# Patient Record
Sex: MALE | Race: OTHER | HISPANIC OR LATINO | Employment: OTHER | URBAN - METROPOLITAN AREA
[De-identification: names, ages, dates, MRNs, and addresses within clinical notes are randomized per-mention and may not be internally consistent; named-entity substitution may affect disease eponyms.]

---

## 2023-10-30 ENCOUNTER — HOSPITAL ENCOUNTER (EMERGENCY)
Facility: HOSPITAL | Age: 38
Discharge: PSYCHIATRIC OR SUBSTANCE USE FACILITY | End: 2023-10-31
Attending: EMERGENCY MEDICINE

## 2023-10-30 DIAGNOSIS — F14.90 COCAINE USE: ICD-10-CM

## 2023-10-30 DIAGNOSIS — R44.0 AUDITORY HALLUCINATIONS: Primary | ICD-10-CM

## 2023-10-30 LAB
ALBUMIN SERPL BCP-MCNC: 3.9 G/DL (ref 3.5–5.2)
ALP SERPL-CCNC: 93 U/L (ref 55–135)
ALT SERPL W/O P-5'-P-CCNC: 16 U/L (ref 10–44)
AMPHET+METHAMPHET UR QL: NEGATIVE
ANION GAP SERPL CALC-SCNC: 11 MMOL/L (ref 8–16)
APAP SERPL-MCNC: <3 UG/ML (ref 10–20)
AST SERPL-CCNC: 12 U/L (ref 10–40)
BARBITURATES UR QL SCN>200 NG/ML: NEGATIVE
BASOPHILS # BLD AUTO: 0.04 K/UL (ref 0–0.2)
BASOPHILS NFR BLD: 0.7 % (ref 0–1.9)
BENZODIAZ UR QL SCN>200 NG/ML: NEGATIVE
BILIRUB SERPL-MCNC: 0.2 MG/DL (ref 0.1–1)
BILIRUB UR QL STRIP: NEGATIVE
BUN SERPL-MCNC: 9 MG/DL (ref 6–20)
BZE UR QL SCN: ABNORMAL
CALCIUM SERPL-MCNC: 9.1 MG/DL (ref 8.7–10.5)
CANNABINOIDS UR QL SCN: NEGATIVE
CHLORIDE SERPL-SCNC: 108 MMOL/L (ref 95–110)
CLARITY UR: CLEAR
CO2 SERPL-SCNC: 26 MMOL/L (ref 23–29)
COLOR UR: YELLOW
CREAT SERPL-MCNC: 0.8 MG/DL (ref 0.5–1.4)
CREAT UR-MCNC: 65.1 MG/DL (ref 23–375)
CTP QC/QA: YES
DIFFERENTIAL METHOD: ABNORMAL
EOSINOPHIL # BLD AUTO: 0.4 K/UL (ref 0–0.5)
EOSINOPHIL NFR BLD: 6.4 % (ref 0–8)
ERYTHROCYTE [DISTWIDTH] IN BLOOD BY AUTOMATED COUNT: 12.7 % (ref 11.5–14.5)
EST. GFR  (NO RACE VARIABLE): >60 ML/MIN/1.73 M^2
ETHANOL SERPL-MCNC: <10 MG/DL
GLUCOSE SERPL-MCNC: 103 MG/DL (ref 70–110)
GLUCOSE UR QL STRIP: NEGATIVE
HCT VFR BLD AUTO: 40.6 % (ref 40–54)
HGB BLD-MCNC: 13.2 G/DL (ref 14–18)
HGB UR QL STRIP: NEGATIVE
IMM GRANULOCYTES # BLD AUTO: 0.01 K/UL (ref 0–0.04)
IMM GRANULOCYTES NFR BLD AUTO: 0.2 % (ref 0–0.5)
KETONES UR QL STRIP: NEGATIVE
LEUKOCYTE ESTERASE UR QL STRIP: NEGATIVE
LYMPHOCYTES # BLD AUTO: 1.9 K/UL (ref 1–4.8)
LYMPHOCYTES NFR BLD: 33.4 % (ref 18–48)
MCH RBC QN AUTO: 29.4 PG (ref 27–31)
MCHC RBC AUTO-ENTMCNC: 32.5 G/DL (ref 32–36)
MCV RBC AUTO: 90 FL (ref 82–98)
METHADONE UR QL SCN>300 NG/ML: NEGATIVE
MONOCYTES # BLD AUTO: 0.6 K/UL (ref 0.3–1)
MONOCYTES NFR BLD: 10.2 % (ref 4–15)
NEUTROPHILS # BLD AUTO: 2.8 K/UL (ref 1.8–7.7)
NEUTROPHILS NFR BLD: 49.1 % (ref 38–73)
NITRITE UR QL STRIP: NEGATIVE
NRBC BLD-RTO: 0 /100 WBC
OPIATES UR QL SCN: NEGATIVE
PCP UR QL SCN>25 NG/ML: NEGATIVE
PH UR STRIP: 6 [PH] (ref 5–8)
PLATELET # BLD AUTO: 271 K/UL (ref 150–450)
PMV BLD AUTO: 11.2 FL (ref 9.2–12.9)
POTASSIUM SERPL-SCNC: 4 MMOL/L (ref 3.5–5.1)
PROT SERPL-MCNC: 6.9 G/DL (ref 6–8.4)
PROT UR QL STRIP: NEGATIVE
RBC # BLD AUTO: 4.49 M/UL (ref 4.6–6.2)
SARS-COV-2 RDRP RESP QL NAA+PROBE: NEGATIVE
SODIUM SERPL-SCNC: 145 MMOL/L (ref 136–145)
SP GR UR STRIP: 1.01 (ref 1–1.03)
TOXICOLOGY INFORMATION: ABNORMAL
TSH SERPL DL<=0.005 MIU/L-ACNC: 2.17 UIU/ML (ref 0.4–4)
URN SPEC COLLECT METH UR: NORMAL
UROBILINOGEN UR STRIP-ACNC: NEGATIVE EU/DL
WBC # BLD AUTO: 5.6 K/UL (ref 3.9–12.7)

## 2023-10-30 PROCEDURE — 80053 COMPREHEN METABOLIC PANEL: CPT

## 2023-10-30 PROCEDURE — 87635 SARS-COV-2 COVID-19 AMP PRB: CPT | Performed by: EMERGENCY MEDICINE

## 2023-10-30 PROCEDURE — 80307 DRUG TEST PRSMV CHEM ANLYZR: CPT

## 2023-10-30 PROCEDURE — 85025 COMPLETE CBC W/AUTO DIFF WBC: CPT

## 2023-10-30 PROCEDURE — 80143 DRUG ASSAY ACETAMINOPHEN: CPT

## 2023-10-30 PROCEDURE — 82077 ASSAY SPEC XCP UR&BREATH IA: CPT

## 2023-10-30 PROCEDURE — 81003 URINALYSIS AUTO W/O SCOPE: CPT | Mod: 59

## 2023-10-30 PROCEDURE — 84443 ASSAY THYROID STIM HORMONE: CPT

## 2023-10-30 PROCEDURE — 99285 EMERGENCY DEPT VISIT HI MDM: CPT

## 2023-10-30 NOTE — FIRST PROVIDER EVALUATION
"Medical screening examination initiated.  I have conducted a focused provider triage encounter, findings are as follows:    Brief history of present illness:  Male presenting to the emergency department with a chief complaint of auditory hallucinations.  He has a history of problem and states that hospitalized in the past.  He has been compliant with his antipsychotic medications until 5-6 days ago.  Symptoms returned 3-4 days ago.  He states that he feels that everyone is looking at him.  Voices telling him to turn himself in before things get bad".  Currently denies any suicidal or homicidal ideation or plan.    Vitals:    10/30/23 1838   BP: 115/70   BP Location: Right arm   Patient Position: Sitting   Pulse: 98   Resp: 17   Temp: 98.2 °F (36.8 °C)   TempSrc: Oral   SpO2: 100%   Weight: 61.2 kg (135 lb)   Height: 5' 5" (1.651 m)       Pertinent physical exam:  Clinically well-appearing.  No acute distress.  Thought appears linear.  Denies SI, HI.    Brief workup plan:  Basic labs, psych evaluation.    Preliminary workup initiated; this workup will be continued and followed by the physician or advanced practice provider that is assigned to the patient when roomed.  "

## 2023-10-31 ENCOUNTER — HOSPITAL ENCOUNTER (INPATIENT)
Facility: HOSPITAL | Age: 38
LOS: 8 days | Discharge: HOME | DRG: 885 | End: 2023-11-08
Attending: PSYCHIATRY & NEUROLOGY | Admitting: PSYCHIATRY & NEUROLOGY

## 2023-10-31 VITALS
WEIGHT: 135 LBS | RESPIRATION RATE: 18 BRPM | HEIGHT: 65 IN | OXYGEN SATURATION: 98 % | SYSTOLIC BLOOD PRESSURE: 114 MMHG | TEMPERATURE: 97 F | BODY MASS INDEX: 22.49 KG/M2 | DIASTOLIC BLOOD PRESSURE: 55 MMHG | HEART RATE: 78 BPM

## 2023-10-31 DIAGNOSIS — F29 PSYCHOSIS: Primary | ICD-10-CM

## 2023-10-31 PROCEDURE — 90833 PR PSYCHOTHERAPY W/PATIENT W/E&M, 30 MIN (ADD ON): ICD-10-PCS | Mod: ,,, | Performed by: PSYCHIATRY & NEUROLOGY

## 2023-10-31 PROCEDURE — 25000003 PHARM REV CODE 250: Performed by: PSYCHIATRY & NEUROLOGY

## 2023-10-31 PROCEDURE — S4991 NICOTINE PATCH NONLEGEND: HCPCS | Performed by: PSYCHIATRY & NEUROLOGY

## 2023-10-31 PROCEDURE — 99223 PR INITIAL HOSPITAL CARE,LEVL III: ICD-10-PCS | Mod: ,,, | Performed by: PSYCHIATRY & NEUROLOGY

## 2023-10-31 PROCEDURE — 90833 PSYTX W PT W E/M 30 MIN: CPT | Mod: ,,, | Performed by: PSYCHIATRY & NEUROLOGY

## 2023-10-31 PROCEDURE — 99223 1ST HOSP IP/OBS HIGH 75: CPT | Mod: ,,, | Performed by: PSYCHIATRY & NEUROLOGY

## 2023-10-31 PROCEDURE — 11400000 HC PSYCH PRIVATE ROOM

## 2023-10-31 RX ORDER — BENZTROPINE MESYLATE 1 MG/ML
2 INJECTION, SOLUTION INTRAMUSCULAR; INTRAVENOUS EVERY 8 HOURS PRN
Status: DISCONTINUED | OUTPATIENT
Start: 2023-10-31 | End: 2023-11-08 | Stop reason: HOSPADM

## 2023-10-31 RX ORDER — ONDANSETRON 4 MG/1
4 TABLET, ORALLY DISINTEGRATING ORAL EVERY 8 HOURS PRN
Status: DISCONTINUED | OUTPATIENT
Start: 2023-10-31 | End: 2023-11-08 | Stop reason: HOSPADM

## 2023-10-31 RX ORDER — BENZONATATE 100 MG/1
100 CAPSULE ORAL 3 TIMES DAILY PRN
Status: DISCONTINUED | OUTPATIENT
Start: 2023-10-31 | End: 2023-11-08 | Stop reason: HOSPADM

## 2023-10-31 RX ORDER — ACETAMINOPHEN 325 MG/1
650 TABLET ORAL EVERY 6 HOURS PRN
Status: DISCONTINUED | OUTPATIENT
Start: 2023-10-31 | End: 2023-11-08 | Stop reason: HOSPADM

## 2023-10-31 RX ORDER — OLANZAPINE 10 MG/1
10 TABLET ORAL DAILY
Status: DISCONTINUED | OUTPATIENT
Start: 2023-10-31 | End: 2023-11-02

## 2023-10-31 RX ORDER — LOPERAMIDE HYDROCHLORIDE 2 MG/1
2 CAPSULE ORAL
Status: DISCONTINUED | OUTPATIENT
Start: 2023-10-31 | End: 2023-11-08 | Stop reason: HOSPADM

## 2023-10-31 RX ORDER — IBUPROFEN 200 MG
1 TABLET ORAL DAILY PRN
Status: DISCONTINUED | OUTPATIENT
Start: 2023-10-31 | End: 2023-11-08 | Stop reason: HOSPADM

## 2023-10-31 RX ORDER — HYDROXYZINE PAMOATE 50 MG/1
50 CAPSULE ORAL EVERY 6 HOURS PRN
Status: DISCONTINUED | OUTPATIENT
Start: 2023-10-31 | End: 2023-11-07

## 2023-10-31 RX ORDER — OLANZAPINE 10 MG/1
10 TABLET ORAL EVERY 8 HOURS PRN
Status: DISCONTINUED | OUTPATIENT
Start: 2023-10-31 | End: 2023-11-08 | Stop reason: HOSPADM

## 2023-10-31 RX ORDER — PROMETHAZINE HYDROCHLORIDE 25 MG/1
25 TABLET ORAL EVERY 6 HOURS PRN
Status: DISCONTINUED | OUTPATIENT
Start: 2023-10-31 | End: 2023-11-08 | Stop reason: HOSPADM

## 2023-10-31 RX ORDER — OLANZAPINE 10 MG/2ML
10 INJECTION, POWDER, FOR SOLUTION INTRAMUSCULAR EVERY 8 HOURS PRN
Status: DISCONTINUED | OUTPATIENT
Start: 2023-10-31 | End: 2023-11-08 | Stop reason: HOSPADM

## 2023-10-31 RX ORDER — MAG HYDROX/ALUMINUM HYD/SIMETH 200-200-20
30 SUSPENSION, ORAL (FINAL DOSE FORM) ORAL EVERY 6 HOURS PRN
Status: DISCONTINUED | OUTPATIENT
Start: 2023-10-31 | End: 2023-11-08 | Stop reason: HOSPADM

## 2023-10-31 RX ORDER — FLUOXETINE 10 MG/1
10 CAPSULE ORAL DAILY
Status: DISCONTINUED | OUTPATIENT
Start: 2023-10-31 | End: 2023-11-04

## 2023-10-31 RX ADMIN — OLANZAPINE 10 MG: 10 TABLET, FILM COATED ORAL at 01:10

## 2023-10-31 RX ADMIN — NICOTINE 1 PATCH: 14 PATCH, EXTENDED RELEASE TRANSDERMAL at 01:10

## 2023-10-31 RX ADMIN — FLUOXETINE 10 MG: 10 CAPSULE ORAL at 01:10

## 2023-10-31 NOTE — ED NOTES
Spoke with [employer] regarding patient transport. States pickup will be after 0600. Charge nurse notified.

## 2023-10-31 NOTE — ED NOTES
Spoke with Jeyson at the  's Office to inform him of [facility] patient. Paperwork has been scanned into hospital system and has been faxed over to  's Office.

## 2023-10-31 NOTE — NURSING
"Emre Enriuqez is a 38 y.o. male, with a past medical history of psychiatric illness, who presents to the ED with auditory hallucinations onset 3-4 days ago. Patient reports hearing voices telling him to "turn himself in" and "I'm scared for my life". He also notes believing people are out to get him. He notes not wanting to harm others, but is afraid of the extent of the voices. Patient reports consumption of EtOH and cocaine this weekend. Patient notes he stopped taking his psychiatric medication 5-6 days ago. He does not remember the name of the medication. No other exacerbating or alleviating factors. Patient denies suicidal ideation, fatigue, or other associated symptoms.   Pt arrived to unit per [facility]. [employer]scan performed with negative results.  [facility] security at side. Pt's belongings given to mht for inventory.  Admission paperwork explained to pt and signed.  See in chart.  Pt states he does not hear voices at this time but did hear them at home.  Pt states the voices were telling him that they were going to do something to his daughter and to turn himself in.  Pt states he is here to get back on his meds.  Denies si, hi.  Pt gravely disabled.  Pt oriented to unit and unit routine.   Verbalized understanding. Pt served a lunch tray and consumed 100 percent.  Pt instructed to call for any needs or concerns at all.  Verbalized understanding.  Will cont to monitor for safety. Pt placed on q15 min close observation as per md orders.    "

## 2023-10-31 NOTE — ED NOTES
[facility] transport, [facility], arrived at ambulance entrance. Informed transport company that patient is increased flight risk. Transport company verbalizes understanding. Patient ambulated out, escorted by Ar and security and stayed until patient was secured in vehicle. Security came back and confirmed with nurse that patient has been secured in the transport vehicle.

## 2023-10-31 NOTE — H&P
"PSYCHIATRY INPATIENT ADMISSION NOTE - H & P      10/31/2023 9:00 AM   Emre Enriquez   1985   [id number]         DATE OF ADMISSION: 10/31/2023  8:53 AM    SITE: [facility]    CURRENT LEGAL STATUS: PEC and/or CEC      HISTORY    CHIEF COMPLAINT   Emre Enriquez is a 38 y.o. male with a past psychiatric history of psychosis currently admitted to the inpatient unit with the following chief complaint: psychosis, "I hear voices."    HPI   The patient was seen and examined. The chart was reviewed.    The patient presented to the ER on 10/31/2023 . Per staff notes:  - Male presenting to the emergency department with a chief complaint of auditory hallucinations.  He has a history of problem and states that hospitalized in the past.  He has been compliant with his antipsychotic medications until 5-6 days ago.  Symptoms returned 3-4 days ago.  He states that he feels that everyone is looking at him.  Voices telling him to turn himself in before things get bad".  Currently denies any suicidal or homicidal ideation or plan.  -complaint of AH. Pt reports he recently stopped his psychiatric medications (unable to name them) 5 days ago and did cocaine over the weekend and became paranoid that people were "out to get him". Pt states he has been hearing voices telling him to "turn himself in". Pt denies SI/HI but reports he is fearful for his life due to AH and paranoia. Pt is AAOX4 and VSS  -X3-4 days of auditory hallucinations. Pt with psych hx and currently not taking his meds for 5-6 days ago. The voices he hears is telling him to "turn himself in and I'm scared for my life." Pt denies SI or HI.   -Emre Enriquez is a 38 y.o. male, with a past medical history of psychiatric illness, who presents to the ED with auditory hallucinations onset 3-4 days ago. Patient reports hearing voices telling him to "turn himself in" and "I'm scared for my life". He also notes believing people are out to get him. He " "notes not wanting to harm others, but is afraid of the extent of the voices. Patient reports consumption of EtOH and cocaine this weekend. Patient notes he stopped taking his psychiatric medication 5-6 days ago. He does not remember the name of the medication. No other exacerbating or alleviating factors. Patient denies suicidal ideation, fatigue, or other associated symptoms.     The patient was medically cleared and admitted to the [facility].    The interview was conducted with an interpretor.     The patient reports that he has a prior h/o AH/psychosis. He was treated then "they went away and then they came back. He was last treated about 2 months ago with an unknown medications.    Voices/AH have been increasingly worse and are telling him that the are screening his children.    He reports that he has been depressed and anxious.      He reports infrequent cocaine use.    Symptoms of Depression: diminished mood - Yes, loss of interest/anhedonia - Yes;  recurrent - Yes, >14 days - Yes, diminished energy - Yes, change in sleep - Yes, change in appetite - Yes, diminished concentration or cognition or indecisiveness - Yes, PMA/R -  No, excessive guilt or hopelessness or worthlessness - Yes, suicidal ideations - No    Changes in Sleep: trouble with initiation- Yes, maintenance, - Yes early morning awakening with inability to return to sleep - No, hypersomnolence - No    Suicidal- active/passive ideations - No, organized plans, future intentions - No    Homicidal ideations: active/passive ideations - No, organized plans, future intentions - No    Symptoms of psychosis: hallucinations - Yes, delusions - No, disorganized speech - No, disorganized behavior or abnormal motor behavior - No, or negative symptoms (diminshed emotional expression, avolition, anhedonia, alogia, asociality) - Yes, active phase symptoms >1 month - Yes, continuous signs of illness > 6 months - No, since onset of illness decreased level of functioning " "present - Yes    Symptoms of omar or hypomania: elevated, expansive, or irritable mood with increased energy or activity - No; > 4 days - No,  >7 days - No; with inflated self-esteem or grandiosity - No, decreased need for sleep - No, increased rate of speech - No, FOI or racing thoughts - No, distractibility - No, increased goal directed activity or PMA - No, risky/disinhibited behavior - No    Symptoms of POLLY: excessive anxiety/worry/fear, more days than not, about numerous issues - Yes, ongoing for >6 months - No, difficult to control - Yes, with restlessness - No, fatigue - Yes, poor concentration - Yes, irritability - Yes, muscle tension - Yes, sleep disturbance - Yes; causes functionally impairing distress - Yes    Symptoms of Panic Disorder: recurrent panic attacks (palpitations/heart racing, sweating, shakiness, dyspnea, choking, chest pain/discomfort, Gi symptoms, dizzy/lightheadedness, hot/col flashes, paresthesias, derealization, fear of losing control or fear of dying or fear of "going crazy") - No, precipitated - No, un-precipitated - No, source of worry and/or behavioral changes secondary for 1 month or longer- No, agoraphobia - No    Symptoms of PTSD: h/o trauma exposure - No; re-experiencing/intrusive symptoms - No, avoidant behavior - No, 2 or more negative alterations in cognition or mood - No, 2 or more hyperarousal symptoms - No; with dissociative symptoms - No, ongoing for 1 or more  months - No    Symptoms of OCD: obsessions (recurrent thoughts/urges/images; intrusive and/or unwanted; uses other thoughts/actions to suppress) - No; compulsions (repetitive behaviors used to lower distress/anxiety/obsessions) - No, time-consuming (over 1 hour per day) or cause significant distress/impairment - - No    Symptoms of Anorexia: restriction of caloric intake leading to significantly low body weight - No, intense fear of gaining weight or persistent behavior that interferes with weight gain even thought " at a significantly low weight - No, disturbance in the way in which one's body weight or shape is experienced, undue influence of body weight or shape on self evaluation, or persistent lack of recognition of the seriousness of the current low body weight - No    Symptoms of Bulimia: recurrent episodes of binge eating (definitely larger amount  than what others would eat and lack of a sense of control over eating during episode) - No, recurrent inappropriate compensatory behaviors in order to prevent weight gain (fasting, medications, exercise, vomiting) - No, binges and compensatory behaviors both occur on average at least once a week for 3 months - No, self evaluations is unduly influenced by body shape/weight- - No    Symptoms of Binge eating: recurrent episodes of binge eating (definitely larger amount than what others would eat and lack of a sense of control over eating during episode) - No, 3 or more of following (eating much more rapidly, eating until uncomfortably full, large amounts when not hungry, eating alone because of embarrassed by how much,  feeling disgusted with oneself, depressed or very guilty afterward) - No, distress regarding binges - No, binges occur on average at least once a week for 3 months - No      Substance/s:  Taken in larger amounts or over longer periods than intended: No,  Persistent desire or unsuccessful attempts to cut down or stop: No,  Great deal of time spent seeking, using or recovering from: No,  Craving or strong desire to use: No,  Recurrent use despite failure to meet major role obligation: No,  Continued use despite persistent or recurrent social/interparsonal issues due to use: No,  Important social/work/recreational activities given up due to use: No,  Recurrent use in physically hazardous situations: No,  Continued use despite knowledge of persistent physical or psychological problem: No,  Tolerance (either increased need or diminished effect): No,  UDS positive for  cocaine- there is concern hat he is minimizing his substance use       Psychotherapy:  Target symptoms: substance abuse, psychosis  Why chosen therapy is appropriate versus another modality: relevant to diagnosis, patient responds to this modality, evidence based practice  Outcome monitoring methods: self-report, observation  Therapeutic intervention type: insight oriented psychotherapy, behavior modifying psychotherapy, supportive psychotherapy, interactive psychotherapy  Topics discussed/themes: building skills sets for symptom management, symptom recognition, substance abuse  The patient's response to the intervention is guarded. The patient's progress toward treatment goals is limited.   Duration of intervention: 16 minutes.      PAST PSYCHIATRIC HISTORY  Previous Psychiatric Hospitalizations: Yes, alst in 8/2023 (?) for psychosis  Previous SI/HI: Yes,  Previous Suicide Attempts: No,   Previous Medication Trials: Yes,  Psychiatric Care (current & past): No,  History of Psychotherapy: No,  History of Violence: No,  History of sexual/physical abuse: No,    PAST MEDICAL & SURGICAL HISTORY   Past Medical History:   Diagnosis Date    History of psychiatric treatment      No past surgical history on file.  denied    CURRENT PSYCH MEDICATION REGIMEN   none  Current Medication side effects:  n/a  Current Medication compliance:  n/a    Previous psych meds trials  Yes- unable to name*    Home Meds:   Prior to Admission medications    Not on File         OTC Meds: none    Scheduled Meds:    PRN Meds:    Psychotherapeutics (From admission, onward)      None            ALLERGIES   Review of patient's allergies indicates:  No Known Allergies    NEUROLOGIC HISTORY  Seizures: No  Head trauma: No    SOCIAL HISTORY:  Developmental/Childhood:Achieved all developmental milestones timely  Education: 5th grade  Employment Status/Finances:Unemployed   Relationship Status/Sexual Orientation:   Children: 2  Housing Status: Home-  LORE with his friend   history:  NO   Access to Firearms: NO ;  Locked up? n/a  Congregational:Actively participates in organized Pentecostal- Hindu  Recreational activities: painting, drawing    SUBSTANCE ABUSE HISTORY   Recreational Drugs: cocaine   Use of Alcohol: occasional, social use  Rehab History:no   Tobacco Use:yes    LEGAL HISTORY:   Past charges/incarcerations: NO  Pending charges:NO    FAMILY PSYCHIATRIC HISTORY   No family history on file.    denied      ROS  General ROS: negative  Ophthalmic ROS: negative  ENT ROS: negative  Allergy and Immunology ROS: negative  Hematological and Lymphatic ROS: negative  Endocrine ROS: negative  Respiratory ROS: no cough, shortness of breath, or wheezing  Cardiovascular ROS: no chest pain or dyspnea on exertion  Gastrointestinal ROS: no abdominal pain, change in bowel habits, or black or bloody stools  Genito-Urinary ROS: no dysuria, trouble voiding, or hematuria  Musculoskeletal ROS: negative  Neurological ROS: no TIA or stroke symptoms  Dermatological ROS: negative        EXAMINATION    PHYSICAL EXAM  Reviewed note/exam by Dr. Mendez from 10/30/23 at 7:51 PM; Med consulted for initial physical exam- pending    VITALS   There were no vitals filed for this visit.     There is no height or weight on file to calculate BMI.    Initial Vitals [10/30/23 1838]   BP Pulse Resp Temp SpO2   115/70 98 17 98.2 °F (36.8 °C) 100 %       PAIN  0/10  Subjective report of pain matches objective signs and symptoms: Yes    LABORATORY DATA   Recent Results (from the past 72 hour(s))   Urinalysis, Reflex to Urine Culture Urine, Clean Catch    Collection Time: 10/30/23  7:26 PM    Specimen: Urine   Result Value Ref Range    Specimen UA Urine, Clean Catch     Color, UA Yellow Yellow, Straw, Hannah    Appearance, UA Clear Clear    pH, UA 6.0 5.0 - 8.0    Specific Gravity, UA 1.010 1.005 - 1.030    Protein, UA Negative Negative    Glucose, UA Negative Negative    Ketones, UA Negative  Negative    Bilirubin (UA) Negative Negative    Occult Blood UA Negative Negative    Nitrite, UA Negative Negative    Urobilinogen, UA Negative <2.0 EU/dL    Leukocytes, UA Negative Negative   Drug screen panel, emergency    Collection Time: 10/30/23  7:26 PM   Result Value Ref Range    Benzodiazepines Negative Negative    Methadone metabolites Negative Negative    Cocaine (Metab.) Presumptive Positive (A) Negative    Opiate Scrn, Ur Negative Negative    Barbiturate Screen, Ur Negative Negative    Amphetamine Screen, Ur Negative Negative    THC Negative Negative    Phencyclidine Negative Negative    Creatinine, Urine 65.1 23.0 - 375.0 mg/dL    Toxicology Information SEE COMMENT    CBC auto differential    Collection Time: 10/30/23  7:29 PM   Result Value Ref Range    WBC 5.60 3.90 - 12.70 K/uL    RBC 4.49 (L) 4.60 - 6.20 M/uL    Hemoglobin 13.2 (L) 14.0 - 18.0 g/dL    Hematocrit 40.6 40.0 - 54.0 %    MCV 90 82 - 98 fL    MCH 29.4 27.0 - 31.0 pg    MCHC 32.5 32.0 - 36.0 g/dL    RDW 12.7 11.5 - 14.5 %    Platelets 271 150 - 450 K/uL    MPV 11.2 9.2 - 12.9 fL    Immature Granulocytes 0.2 0.0 - 0.5 %    Gran # (ANC) 2.8 1.8 - 7.7 K/uL    Immature Grans (Abs) 0.01 0.00 - 0.04 K/uL    Lymph # 1.9 1.0 - 4.8 K/uL    Mono # 0.6 0.3 - 1.0 K/uL    Eos # 0.4 0.0 - 0.5 K/uL    Baso # 0.04 0.00 - 0.20 K/uL    nRBC 0 0 /100 WBC    Gran % 49.1 38.0 - 73.0 %    Lymph % 33.4 18.0 - 48.0 %    Mono % 10.2 4.0 - 15.0 %    Eosinophil % 6.4 0.0 - 8.0 %    Basophil % 0.7 0.0 - 1.9 %    Differential Method Automated    Comprehensive metabolic panel    Collection Time: 10/30/23  7:29 PM   Result Value Ref Range    Sodium 145 136 - 145 mmol/L    Potassium 4.0 3.5 - 5.1 mmol/L    Chloride 108 95 - 110 mmol/L    CO2 26 23 - 29 mmol/L    Glucose 103 70 - 110 mg/dL    BUN 9 6 - 20 mg/dL    Creatinine 0.8 0.5 - 1.4 mg/dL    Calcium 9.1 8.7 - 10.5 mg/dL    Total Protein 6.9 6.0 - 8.4 g/dL    Albumin 3.9 3.5 - 5.2 g/dL    Total Bilirubin 0.2 0.1 -  "1.0 mg/dL    Alkaline Phosphatase 93 55 - 135 U/L    AST 12 10 - 40 U/L    ALT 16 10 - 44 U/L    eGFR >60 >60 mL/min/1.73 m^2    Anion Gap 11 8 - 16 mmol/L   TSH    Collection Time: 10/30/23  7:29 PM   Result Value Ref Range    TSH 2.169 0.400 - 4.000 uIU/mL   Ethanol    Collection Time: 10/30/23  7:29 PM   Result Value Ref Range    Alcohol, Serum <10 <10 mg/dL   Acetaminophen level    Collection Time: 10/30/23  7:29 PM   Result Value Ref Range    Acetaminophen (Tylenol), Serum <3.0 (L) 10.0 - 20.0 ug/mL   POCT COVID-19 Rapid Screening    Collection Time: 10/30/23  7:47 PM   Result Value Ref Range    POC Rapid COVID Negative Negative     Acceptable Yes       No results found for: "PHENYTOIN", "PHENOBARB", "VALPROATE", "CBMZ"        CONSTITUTIONAL  General Appearance: unremarkable, age appropriate    MUSCULOSKELETAL  Muscle Strength and Tone:no dyskinesia, no dystonia, no tremor, no tic  Abnormal Involuntary Movements: No  Gait and Station: non-ataxic    PSYCHIATRIC   Level of Consciousness: awake and alert   Orientation: person, place, time, and situation  Grooming: Hospital garb  Psychomotor Behavior: normal, cooperative, eye contact normal, no PMA/R  Speech: normal tone, normal rate, normal pitch, normal volume, spontaneous  Language: grossly intact, able to name, able to repeat- Primary language is Romansh   Mood: anxious and depressed  Affect: Anxious, Consistent with mood, Congruent with thought, and Constricted  Thought Process: linear, logical  Associations: intact   Thought Content: denies SI, denies HI, and no delusions  Perceptions: +AH and denies  VH  Memory: Able to recall past events, Remote intact, and Recent intact  Attention:Normal and Attends to interview without distraction  Fund of Knowledge: Aware of current events and Vocabulary appropriate   Estimate if Intelligence: Low Average based on work/education history, vocabulary and mental status exam  Insight: intact, has awareness of " illness- fair  Judgment: behavior is adequate to circumstances, age appropriate- fair      PSYCHOSOCIAL    PSYCHOSOCIAL STRESSORS   family, financial, occupational, and drug     FUNCTIONING RELATIONSHIPS   good support system    STRENGTHS AND LIABILITIES   Strength: Patient accepts guidance/feedback, Strength: Patient is expressive/articulate., Liability: Patient is unstable., Liability: Patient lacks coping skills.    Is the patient aware of the biomedical complications associated with substance abuse and mental illness? yes    Does the patient have an Advance Directive for Mental Health treatment? no  (If yes, inform patient to bring copy.)        MEDICAL DECISION MAKING        ASSESSMENT     Schizophrenia, chronic with acute exacerbation, severe  Unspecified mood disorder  Unspecified Anxiety Disorder    Cocaine Use disorder  Nicotine Dependence     Psychosocial stressors      PROBLEM LIST AND MANAGEMENT PLANS    Psychosis: pt counseled  -start trial of Zyprexa 5 mg po q HS    Mood: pt counseled   -start trial of prozac 10 mg po q day    Anxiety: pt counseled  -meds as above  -start vistaril 50 mg po q 6 hours prn    Nicotine Dependence: pt counseled  -start nicotine 14 mg patch dermal daily    Cocaine use: pt counseled  -seek rehab if willing    Psychosocial stressors: pt counseled  -SW consulted to assist wit resources             PRESCRIPTION DRUG MANAGEMENT  Compliance: unknown  Side Effects: no  Regimen Adjustments: see above    Discussed diagnosis, risks and benefits of proposed treatment vs alternative treatments vs no treatment, potential side effects of these treatments and the inherent unpredictability of treatment. The patient expresses understanding of the above and displays the capacity to agree with this treatment given said understanding. Patient also agrees that, currently, the benefits outweigh the risks and would like to pursue/continue treatment at this time.    Any medications being used  off-label were discussed with the patient inclusive of the evidence base for the use of the medications and consent was obtained for the off-label use of the medication.         DIAGNOSTIC TESTING  Labs reviewed with patient; follow up pending labs    Disposition:  -Will attempt to obtain outside psychiatric records if available  -SW to assist with aftercare planning and collateral  -Once stable discharge home with outpatient follow up care and/or rehab  -Continue inpatient treatment under a PEC and/or CEC for danger to self/ danger to others/grave disability as evident by significant psychotic thought disorder, danger to self, and gravely disabled        Jay Rock MD  Psychiatry

## 2023-10-31 NOTE — ED TRIAGE NOTES
"Emre Enriquez, a 38 y.o. male presents to the ED w/ complaint of AH. Pt reports he recently stopped his psychiatric medications (unable to name them) 5 days ago and did cocaine over the weekend and became paranoid that people were "out to get him". Pt states he has been hearing voices telling him to "turn himself in". Pt denies SI/HI but reports he is fearful for his life due to AH and paranoia. Pt is AAOX4 and VSS.    Triage note:  Chief Complaint   Patient presents with    Hallucinations     X3-4 days of auditory hallucinations. Pt with psych hx and currently not taking his meds for 5-6 days ago. The voices he hears is telling him to "turn himself in and I'm scared for my life." Pt denies SI or HI.       Review of patient's allergies indicates:  No Known Allergies  Past Medical History:   Diagnosis Date    History of psychiatric treatment       "

## 2023-10-31 NOTE — ED NOTES
Pt appears to be resting . Respirations even and unlabored. Equal rise and fall of chest noted. Skin warm and dry. Sitter at bedside. Care ongoing.

## 2023-10-31 NOTE — ED NOTES
Pt appears to be resting . Respirations even and unlabored. Equal rise and fall of chest noted. Skin warm and dry. Sitter at bedside for 1:1 observation. Care ongoing.

## 2023-10-31 NOTE — ED NOTES
Assumed care of patient. Introduced self to patient. Denies needs or complaints at this time. Patient is aware of plan of care. Sitter at bedside for 1:1 observation. Calm and cooperative. Care ongoing.

## 2023-10-31 NOTE — ED PROVIDER NOTES
"Encounter Date: 10/30/2023    SCRIBE #1 NOTE: I, Wayne Howe Do, am scribing for, and in the presence of,  Anders Mendez MD. I have scribed the following portions of the note - Other sections scribed: HPI, ROS, PE.   SCRIBE #2 NOTE: I, Rodriguez Mckeonarez, am scribing for, and in the presence of,  Anders Mendez MD. I have scribed the remaining portions of the note not scribed by Scribe #1.     History     Chief Complaint   Patient presents with    Hallucinations     X3-4 days of auditory hallucinations. Pt with psych hx and currently not taking his meds for 5-6 days ago. The voices he hears is telling him to "turn himself in and I'm scared for my life." Pt denies SI or HI.       Emre Enriquez is a 38 y.o. male, with a past medical history of psychiatric illness, who presents to the ED with auditory hallucinations onset 3-4 days ago. Patient reports hearing voices telling him to "turn himself in" and "I'm scared for my life". He also notes believing people are out to get him. He notes not wanting to harm others, but is afraid of the extent of the voices. Patient reports consumption of EtOH and cocaine this weekend. Patient notes he stopped taking his psychiatric medication 5-6 days ago. He does not remember the name of the medication. No other exacerbating or alleviating factors. Patient denies suicidal ideation, fatigue, or other associated symptoms.       The history is provided by the patient. The history is limited by the condition of the patient. No  was used.     Review of patient's allergies indicates:  No Known Allergies  Past Medical History:   Diagnosis Date    History of psychiatric treatment      History reviewed. No pertinent surgical history.  History reviewed. No pertinent family history.  Social History     Tobacco Use    Smoking status: Never   Substance Use Topics    Alcohol use: Yes     Comment: occ    Drug use: Yes     Types: Cocaine     Review of Systems   Constitutional:  " Negative for chills, fatigue and fever.   HENT:  Negative for congestion.    Respiratory:  Negative for cough and shortness of breath.    Gastrointestinal:  Negative for abdominal pain, diarrhea and nausea.   Genitourinary:  Negative for dysuria.   Musculoskeletal:  Negative for back pain.   Skin:  Negative for rash.   Neurological:  Negative for headaches.   Psychiatric/Behavioral:  Positive for hallucinations. Negative for suicidal ideas.        Physical Exam     Initial Vitals [10/30/23 1838]   BP Pulse Resp Temp SpO2   115/70 98 17 98.2 °F (36.8 °C) 100 %      MAP       --         Physical Exam    Nursing note and vitals reviewed.  Constitutional: He appears well-developed. He is not diaphoretic. No distress.   HENT:   Head: Normocephalic.   Eyes: EOM are normal.   EOMI  (-) Nystagmus   Cardiovascular:  Normal rate and regular rhythm.           No murmur heard.  Pulmonary/Chest: Effort normal and breath sounds normal. He has no wheezes.   Abdominal: Abdomen is soft. He exhibits no distension. There is no abdominal tenderness.   Musculoskeletal:         General: Normal range of motion.     Neurological: He is alert.   (-) dysarthria  (-) extension tremor   Skin: Skin is warm.   Psychiatric:   Calm and Cooperative  (+) auditory hallucinations  (-) suicidal ideation         ED Course   Procedures  Labs Reviewed   CBC W/ AUTO DIFFERENTIAL - Abnormal; Notable for the following components:       Result Value    RBC 4.49 (*)     Hemoglobin 13.2 (*)     All other components within normal limits   DRUG SCREEN PANEL, URINE EMERGENCY - Abnormal; Notable for the following components:    Cocaine (Metab.) Presumptive Positive (*)     All other components within normal limits    Narrative:     Specimen Source->Urine   ACETAMINOPHEN LEVEL - Abnormal; Notable for the following components:    Acetaminophen (Tylenol), Serum <3.0 (*)     All other components within normal limits   COMPREHENSIVE METABOLIC PANEL   TSH   URINALYSIS,  REFLEX TO URINE CULTURE    Narrative:     Specimen Source->Urine   ALCOHOL,MEDICAL (ETHANOL)   SARS-COV-2 RDRP GENE          Imaging Results    None          Medications - No data to display  Medical Decision Making    38 year old male for auditory command hallucinations.  Patient reports voices.  This could be secondary to cocaine use however patient does report history of previous psychiatric illness requiring medications which she has not taken 5 days.  Patient is calm cooperative at this time.  Patient is medically clear for psychiatric evaluation.      Differential toxidrome, manic episode.    Amount and/or Complexity of Data Reviewed  Labs: ordered.            Scribe Attestation:   Scribe #1: I performed the above scribed service and the documentation accurately describes the services I performed. I attest to the accuracy of the note.  Scribe #2: I performed the above scribed service and the documentation accurately describes the services I performed. I attest to the accuracy of the note.           Medically cleared for psychiatry placement: 10/30/2023  9:10 PM          I, Anders Mendez, personally performed the services described in this documentation. All medical record entries made by the scribe were at my direction and in my presence. I have reviewed the chart and agree that the record reflects my personal performance and is accurate and complete.    Clinical Impression:   Final diagnoses:  [R44.0] Auditory hallucinations (Primary)  [F14.90] Cocaine use        ED Disposition Condition    Transfer to Psych Facility Stable          ED Prescriptions    None       Follow-up Information    None          Anders Mendez MD  10/30/23 1817

## 2023-11-01 LAB
CHOLEST SERPL-MCNC: 167 MG/DL (ref 120–199)
CHOLEST/HDLC SERPL: 3.7 {RATIO} (ref 2–5)
ESTIMATED AVG GLUCOSE: 108 MG/DL (ref 68–131)
HBA1C MFR BLD: 5.4 % (ref 4–5.6)
HDLC SERPL-MCNC: 45 MG/DL (ref 40–75)
HDLC SERPL: 26.9 % (ref 20–50)
LDLC SERPL CALC-MCNC: 93 MG/DL (ref 63–159)
NONHDLC SERPL-MCNC: 122 MG/DL
TRIGL SERPL-MCNC: 145 MG/DL (ref 30–150)

## 2023-11-01 PROCEDURE — 99232 SBSQ HOSP IP/OBS MODERATE 35: CPT | Mod: ,,, | Performed by: PSYCHIATRY & NEUROLOGY

## 2023-11-01 PROCEDURE — 99232 PR SUBSEQUENT HOSPITAL CARE,LEVL II: ICD-10-PCS | Mod: ,,, | Performed by: PSYCHIATRY & NEUROLOGY

## 2023-11-01 PROCEDURE — 36415 COLL VENOUS BLD VENIPUNCTURE: CPT | Performed by: PSYCHIATRY & NEUROLOGY

## 2023-11-01 PROCEDURE — 83036 HEMOGLOBIN GLYCOSYLATED A1C: CPT | Performed by: PSYCHIATRY & NEUROLOGY

## 2023-11-01 PROCEDURE — 25000003 PHARM REV CODE 250: Performed by: INTERNAL MEDICINE

## 2023-11-01 PROCEDURE — 80061 LIPID PANEL: CPT | Performed by: PSYCHIATRY & NEUROLOGY

## 2023-11-01 PROCEDURE — 90833 PSYTX W PT W E/M 30 MIN: CPT | Mod: ,,, | Performed by: PSYCHIATRY & NEUROLOGY

## 2023-11-01 PROCEDURE — 11400000 HC PSYCH PRIVATE ROOM

## 2023-11-01 PROCEDURE — 90833 PR PSYCHOTHERAPY W/PATIENT W/E&M, 30 MIN (ADD ON): ICD-10-PCS | Mod: ,,, | Performed by: PSYCHIATRY & NEUROLOGY

## 2023-11-01 PROCEDURE — 25000003 PHARM REV CODE 250: Performed by: PSYCHIATRY & NEUROLOGY

## 2023-11-01 RX ORDER — HYDROXYZINE PAMOATE 50 MG/1
100 CAPSULE ORAL ONCE
Status: COMPLETED | OUTPATIENT
Start: 2023-11-01 | End: 2023-11-01

## 2023-11-01 RX ADMIN — OLANZAPINE 10 MG: 10 TABLET, FILM COATED ORAL at 09:11

## 2023-11-01 RX ADMIN — FLUOXETINE 10 MG: 10 CAPSULE ORAL at 09:11

## 2023-11-01 RX ADMIN — HYDROXYZINE PAMOATE 100 MG: 50 CAPSULE ORAL at 11:11

## 2023-11-01 NOTE — H&P
"[facility] Hospital Medicine  History & Physical    Patient Name: Emre Enriquez  MRN: [id number]  Patient Class: IP- Psych  Admission Date: 10/31/2023  Attending Physician: Jay Rock MD   Primary Care Provider: [facility] Primary Doctor         Patient information was obtained from ER records.     Subjective:     Principal Problem:Psychosis    Chief Complaint: No chief complaint on file.       HPI:   Chief Complaint   Patient presents with    Hallucinations       X3-4 days of auditory hallucinations. Pt with psych hx and currently not taking his meds for 5-6 days ago. The voices he hears is telling him to "turn himself in and I'm scared for my life." Pt denies SI or HI.        Emre Enriquez is a 38 y.o. male, with a past medical history of psychiatric illness, who presents to the ED with auditory hallucinations onset 3-4 days ago. Patient reports hearing voices telling him to "turn himself in" and "I'm scared for my life". He also notes believing people are out to get him. He notes not wanting to harm others, but is afraid of the extent of the voices. Patient reports consumption of EtOH and cocaine this weekend. Patient notes he stopped taking his psychiatric medication 5-6 days ago. He does not remember the name of the medication. No other exacerbating or alleviating factors. Patient denies suicidal ideation, fatigue, or other associated symptoms.          Past Medical History:   Diagnosis Date    History of psychiatric treatment        No past surgical history on file.    Review of patient's allergies indicates:  No Known Allergies    No current facility-administered medications on file prior to encounter.     No current outpatient medications on file prior to encounter.     Family History    None       Tobacco Use    Smoking status: Never    Smokeless tobacco: Not on file   Substance and Sexual Activity    Alcohol use: Yes     Comment: occ    Drug use: Yes "     Types: Cocaine    Sexual activity: Yes     Review of Systems   Constitutional:  Negative for fatigue and fever.   HENT:  Negative for congestion, ear pain and sore throat.    Eyes:  Negative for pain and discharge.   Respiratory:  Negative for cough, shortness of breath and wheezing.    Gastrointestinal:  Negative for abdominal pain, constipation, diarrhea, nausea and vomiting.   Endocrine: Negative for cold intolerance and heat intolerance.   Genitourinary:  Negative for difficulty urinating, dysuria and frequency.   Musculoskeletal:  Negative for arthralgias.   Allergic/Immunologic: Negative for environmental allergies.   Neurological:  Negative for dizziness, tremors and seizures.   Psychiatric/Behavioral:  Positive for behavioral problems, hallucinations and sleep disturbance.    All other systems reviewed and are negative.    Objective:     Vital Signs (Most Recent):  Temp: 97.8 °F (36.6 °C) (11/01/23 0737)  Pulse: 68 (11/01/23 0737)  Resp: 20 (11/01/23 0737)  BP: 115/73 (11/01/23 0737)  SpO2: 100 % (11/01/23 0737) Vital Signs (24h Range):  Temp:  [97.8 °F (36.6 °C)-98.6 °F (37 °C)] 97.8 °F (36.6 °C)  Pulse:  [68-76] 68  Resp:  [16-20] 20  SpO2:  [99 %-100 %] 100 %  BP: (104-115)/(65-78) 115/73     Weight: 61.2 kg (134 lb 14.7 oz)  Body mass index is 22.45 kg/m².     Physical Exam  Vitals and nursing note reviewed.   Constitutional:       Appearance: Normal appearance.   HENT:      Head: Normocephalic and atraumatic.      Nose: Nose normal.      Mouth/Throat:      Mouth: Mucous membranes are moist.      Pharynx: Oropharynx is clear.   Eyes:      Extraocular Movements: Extraocular movements intact.      Conjunctiva/sclera: Conjunctivae normal.      Pupils: Pupils are equal, round, and reactive to light.   Cardiovascular:      Rate and Rhythm: Normal rate and regular rhythm.      Pulses: Normal pulses.      Heart sounds: Normal heart sounds.   Pulmonary:      Effort: Pulmonary effort is normal.      Breath  sounds: Normal breath sounds.   Abdominal:      General: Abdomen is flat. Bowel sounds are normal.      Palpations: Abdomen is soft.   Musculoskeletal:         General: Normal range of motion.      Cervical back: Normal range of motion and neck supple.   Skin:     General: Skin is warm and dry.      Capillary Refill: Capillary refill takes less than 2 seconds.      Comments: No rashes on limited skin exam.   Neurological:      General: No focal deficit present.      Mental Status: He is alert and oriented to person, place, and time.      Cranial Nerves: No cranial nerve deficit.      Comments: I Olfactory:  Sense of smell intact    II Optic:  Pupils equal round react to light.  Vision intact.    III, IV, VI, Ocular motor, Trochlear, Abducens:  Extraocular movements intact    V Trigeminal:  Facial sensation intact facial sensation intact,, muscles of mastication intact muscles of mastication intact, corneal reflex intact, corneal reflex intact    VII Facial:  Muscles of facial expression intact     VIII Vestibular cochlear: Hearing intact vestibular cochlear: Hearing intact    IX Glossopharyngeal:  Gag reflex intact.  Tasting intact.     X Vagus:  Gag reflex intact.    XI Spinal Accessory:  Shoulder shrug intact.  Head rotation intact.    XII Hypoglossal:  Tongue movements intact.     Psychiatric:      Comments: Patient appears depressed.  AH              CRANIAL NERVES     CN III, IV, VI   Pupils are equal, round, and reactive to light.       Significant Labs: All pertinent labs within the past 24 hours have been reviewed.    Significant Imaging: I have reviewed all pertinent imaging results/findings within the past 24 hours.    Assessment/Plan:     * Psychosis  To be admitted to our inpatient psychiatric unit for further evaluation and management.          VTE Risk Mitigation (From admission, onward)    None                         Fernando Gary Jr, MD  Department of Hospital Medicine [facility]    N/A  Family history is reviewed and has not changed   Pertinent information:

## 2023-11-01 NOTE — NURSING
POC reviewed with patient and continued today.  used during doctor and patient meeting. Patient compliant with medications and polite with patients and staff. Patient denies SI, HI. Patient spent a majority of day in activity room watching TV. Patient ate 3 meals and snacks today. Will continue to monitor patient while on unit.

## 2023-11-01 NOTE — SUBJECTIVE & OBJECTIVE
Past Medical History:   Diagnosis Date    History of psychiatric treatment        No past surgical history on file.    Review of patient's allergies indicates:  No Known Allergies    No current facility-administered medications on file prior to encounter.     No current outpatient medications on file prior to encounter.     Family History    None       Tobacco Use    Smoking status: Never    Smokeless tobacco: Not on file   Substance and Sexual Activity    Alcohol use: Yes     Comment: occ    Drug use: Yes     Types: Cocaine    Sexual activity: Yes     Review of Systems   Constitutional:  Negative for fatigue and fever.   HENT:  Negative for congestion, ear pain and sore throat.    Eyes:  Negative for pain and discharge.   Respiratory:  Negative for cough, shortness of breath and wheezing.    Gastrointestinal:  Negative for abdominal pain, constipation, diarrhea, nausea and vomiting.   Endocrine: Negative for cold intolerance and heat intolerance.   Genitourinary:  Negative for difficulty urinating, dysuria and frequency.   Musculoskeletal:  Negative for arthralgias.   Allergic/Immunologic: Negative for environmental allergies.   Neurological:  Negative for dizziness, tremors and seizures.   Psychiatric/Behavioral:  Positive for behavioral problems, hallucinations and sleep disturbance.    All other systems reviewed and are negative.    Objective:     Vital Signs (Most Recent):  Temp: 97.8 °F (36.6 °C) (11/01/23 0737)  Pulse: 68 (11/01/23 0737)  Resp: 20 (11/01/23 0737)  BP: 115/73 (11/01/23 0737)  SpO2: 100 % (11/01/23 0737) Vital Signs (24h Range):  Temp:  [97.8 °F (36.6 °C)-98.6 °F (37 °C)] 97.8 °F (36.6 °C)  Pulse:  [68-76] 68  Resp:  [16-20] 20  SpO2:  [99 %-100 %] 100 %  BP: (104-115)/(65-78) 115/73     Weight: 61.2 kg (134 lb 14.7 oz)  Body mass index is 22.45 kg/m².     Physical Exam  Vitals and nursing note reviewed.   Constitutional:       Appearance: Normal appearance.   HENT:      Head: Normocephalic and  atraumatic.      Nose: Nose normal.      Mouth/Throat:      Mouth: Mucous membranes are moist.      Pharynx: Oropharynx is clear.   Eyes:      Extraocular Movements: Extraocular movements intact.      Conjunctiva/sclera: Conjunctivae normal.      Pupils: Pupils are equal, round, and reactive to light.   Cardiovascular:      Rate and Rhythm: Normal rate and regular rhythm.      Pulses: Normal pulses.      Heart sounds: Normal heart sounds.   Pulmonary:      Effort: Pulmonary effort is normal.      Breath sounds: Normal breath sounds.   Abdominal:      General: Abdomen is flat. Bowel sounds are normal.      Palpations: Abdomen is soft.   Musculoskeletal:         General: Normal range of motion.      Cervical back: Normal range of motion and neck supple.   Skin:     General: Skin is warm and dry.      Capillary Refill: Capillary refill takes less than 2 seconds.      Comments: No rashes on limited skin exam.   Neurological:      General: No focal deficit present.      Mental Status: He is alert and oriented to person, place, and time.      Cranial Nerves: No cranial nerve deficit.      Comments: I Olfactory:  Sense of smell intact    II Optic:  Pupils equal round react to light.  Vision intact.    III, IV, VI, Ocular motor, Trochlear, Abducens:  Extraocular movements intact    V Trigeminal:  Facial sensation intact facial sensation intact,, muscles of mastication intact muscles of mastication intact, corneal reflex intact, corneal reflex intact    VII Facial:  Muscles of facial expression intact     VIII Vestibular cochlear: Hearing intact vestibular cochlear: Hearing intact    IX Glossopharyngeal:  Gag reflex intact.  Tasting intact.     X Vagus:  Gag reflex intact.    XI Spinal Accessory:  Shoulder shrug intact.  Head rotation intact.    XII Hypoglossal:  Tongue movements intact.     Psychiatric:      Comments: Patient appears depressed.  AH              CRANIAL NERVES     CN III, IV, VI   Pupils are equal, round,  and reactive to light.       Significant Labs: All pertinent labs within the past 24 hours have been reviewed.    Significant Imaging: I have reviewed all pertinent imaging results/findings within the past 24 hours.

## 2023-11-01 NOTE — HPI
"Chief Complaint   Patient presents with    Hallucinations       X3-4 days of auditory hallucinations. Pt with psych hx and currently not taking his meds for 5-6 days ago. The voices he hears is telling him to "turn himself in and I'm scared for my life." Pt denies SI or HI.        Emre Enriquez is a 38 y.o. male, with a past medical history of psychiatric illness, who presents to the ED with auditory hallucinations onset 3-4 days ago. Patient reports hearing voices telling him to "turn himself in" and "I'm scared for my life". He also notes believing people are out to get him. He notes not wanting to harm others, but is afraid of the extent of the voices. Patient reports consumption of EtOH and cocaine this weekend. Patient notes he stopped taking his psychiatric medication 5-6 days ago. He does not remember the name of the medication. No other exacerbating or alleviating factors. Patient denies suicidal ideation, fatigue, or other associated symptoms.      "

## 2023-11-01 NOTE — PLAN OF CARE
Pt. Is alert, awake, and oriented x 4. He denied any c/o auditory or visual hallucinations at this time. Pt. Also denied suicidal or homicidal ideations. He sat and watched TV with other Pts. He went to bed before 10:00 p.m. Wll cont. To monitor Pt.

## 2023-11-01 NOTE — PROGRESS NOTES
PSYCHIATRY DAILY INPATIENT PROGRESS NOTE  SUBSEQUENT HOSPITAL VISIT    ENCOUNTER DATE: 11/1/2023  SITE: [facility]    DATE OF ADMISSION: 10/31/2023  8:53 AM  LENGTH OF STAY: 1 days    CHIEF COMPLAINT   Emre Enriquez is a 38 y.o. male, seen during daily abel rounds on the inpatient unit.  Emre Enriquez presented with the chief complaint of psychosis    The patient was seen and examined. The chart was reviewed.     Reviewed notes from Rns and MD and labs from the last 24 hours.    The patient's case was discussed with the treatment team/care providers today including Rns and MD    Staff reports no behavioral or management issues.     The patient has been compliant with treatment.    Subjective 11/01/2023    Patient assessed today with assistance from hospital  line.    Patient reports reason for hospitalization is I was hearing voices and they were scaring me.  He reports that he was hospitalized for similar symptoms approximately 1 month ago, denies any hospitalizations prior to this.  Patient does endorse recent cocaine use prior to this hospitalization as well as use prior to previous hospitalization.  He denies any history of psychosis in the absence of drug use.  Today, patient continues to endorse auditory hallucinations, however he states they have decreased in intensity and volume compared to yesterday.    Patient complains of itching to abdomen, left side, some erythema is noted.  Will administer Vistaril.    Patient states that after discharge he plans on going back to live with his friend in [place].  At this time, he is not interested in inpatient or outpatient rehab and states his cocaine use is occasional.    The patient denies any side effects to medications.    Psychiatric ROS (observed, reported, or endorsed/denied):  Depressed mood - No  Interest/pleasure/anhedonia: No  Guilt/hopelessness/worthlessness - No  Changes in Sleep - No  Changes in Appetite -  No  Changes in Concentration - No  Changes in Energy - No  PMA/R- No  Suicidal- active/passive ideations - No  Homicidal ideations: active/passive ideations - No    Hallucinations - fluctuating  Delusions - No  Disorganized behavior - No  Disorganized speech - No  Negative symptoms - No    Elevated mood - No  Decreased need for sleep - No  Grandiosity - No  Racing thoughts - No  Impulsivity - No  Irritability- No  Increased energy - No  Distractibility - No  Increase in goal-directed activity or PMA- No    Symptoms of POLLY - No  Symptoms of Panic Disorder- No  Symptoms of PTSD - No    Overall progress: Patient is showing mild improvement     Psychotherapy:  Target symptoms: substance abuse, psychosis  Why chosen therapy is appropriate versus another modality: relevant to diagnosis, evidence based practice  Outcome monitoring methods: self-report, observation  Therapeutic intervention type: insight oriented psychotherapy, supportive psychotherapy  Topics discussed/themes: building skills sets for symptom management, symptom recognition, substance abuse  The patient's response to the intervention is accepting. The patient's progress toward treatment goals is fair.   Duration of intervention: 16 minutes.    Medical ROS  Review of Systems   Constitutional: Negative.    HENT: Negative.     Eyes: Negative.    Respiratory: Negative.     Cardiovascular: Negative.    Gastrointestinal: Negative.    Genitourinary: Negative.    Musculoskeletal: Negative.    Skin:  Positive for itching.   Neurological: Negative.    Endo/Heme/Allergies: Negative.    Psychiatric/Behavioral:  Positive for hallucinations and substance abuse.        PAST MEDICAL HISTORY   Past Medical History:   Diagnosis Date    History of psychiatric treatment        PSYCHOTROPIC MEDICATIONS   Scheduled Meds:   FLUoxetine  10 mg Oral Daily    OLANZapine  10 mg Oral Daily     Continuous Infusions:  PRN Meds:.acetaminophen, aluminum-magnesium hydroxide-simethicone,  "benzonatate, benztropine mesylate, hydrOXYzine pamoate, loperamide, nicotine, OLANZapine **AND** OLANZapine, ondansetron, promethazine    EXAMINATION    VITALS   Vitals:    10/31/23 0900 10/31/23 1909 11/01/23 0737   BP: 115/65 104/78 115/73   BP Location: Right arm Left arm Left arm   Patient Position: Sitting Sitting Sitting   Pulse: 71 76 68   Resp: 18 16 20   Temp: 98.5 °F (36.9 °C) 98.6 °F (37 °C) 97.8 °F (36.6 °C)   TempSrc:  Oral Oral   SpO2: 99% 99% 100%   Weight: 61.2 kg (134 lb 14.7 oz)     Height: 5' 5" (1.651 m)         Body mass index is 22.45 kg/m².    CONSTITUTIONAL  General Appearance: unremarkable, age appropriate, normal weight    MUSCULOSKELETAL  Muscle Strength and Tone:no tremor, no tic  Abnormal Involuntary Movements: No  Gait and Station: non-ataxic    PSYCHIATRIC   Level of Consciousness: awake, alert , and oriented  Orientation: person, place, time, and situation  Grooming: Casually dressed and Well groomed  Psychomotor Behavior: normal, cooperative  Speech: normal tone, normal rate, normal pitch, normal volume  Language: grossly intact  Mood: fine  Affect: Blunted  Thought Process: linear, logical  Associations: intact   Thought Content: DENIES suicidal ideation, DENIES homicidal ideation, and no delusions  Perceptions: hallucinations:  auditory  Memory: Able to recall past events, Remote intact, and Recent intact  Attention:Attends to interview without distraction  Fund of Knowledge: Aware of current events and Vocabulary appropriate   Estimate if Intelligence:  Average based on work/education history, vocabulary and mental status exam  Insight: has awareness of illness  Judgment: behavior is adequate to circumstances    DIAGNOSTIC TESTING   Laboratory Results  Recent Results (from the past 24 hour(s))   Hemoglobin A1c    Collection Time: 11/01/23  5:38 AM   Result Value Ref Range    Hemoglobin A1C 5.4 4.0 - 5.6 %    Estimated Avg Glucose 108 68 - 131 mg/dL   Lipid Panel    Collection " Time: 11/01/23  5:38 AM   Result Value Ref Range    Cholesterol 167 120 - 199 mg/dL    Triglycerides 145 30 - 150 mg/dL    HDL 45 40 - 75 mg/dL    LDL Cholesterol 93.0 63.0 - 159.0 mg/dL    HDL/Cholesterol Ratio 26.9 20.0 - 50.0 %    Total Cholesterol/HDL Ratio 3.7 2.0 - 5.0    Non-HDL Cholesterol 122 mg/dL       MEDICAL DECISION MAKING   ASSESSMENT      Schizophrenia, chronic with acute exacerbation, severe  Unspecified mood disorder  Unspecified Anxiety Disorder     Cocaine Use disorder  Nicotine Dependence      Psychosocial stressors        PROBLEM LIST AND MANAGEMENT PLANS     Psychosis: pt counseled  -Zyprexa increased to 10 mg qday   -Likely substance-induced     Mood: pt counseled   -start trial of prozac 10 mg po q day     Anxiety: pt counseled  -meds as above  -start vistaril 50 mg po q 6 hours prn     Nicotine Dependence: pt counseled  -start nicotine 14 mg patch dermal daily     Cocaine use: pt counseled  -seek rehab if willing     Psychosocial stressors: pt counseled  -SW consulted to assist wit resources   Discussed diagnosis, risks and benefits of proposed treatment vs alternative treatments vs no treatment, potential side effects of these treatments and the inherent unpredictability of treatment. The patient expresses understanding of the above and displays the capacity to agree with this treatment given said understanding. Patient also agrees that, currently, the benefits outweigh the risks and would like to pursue/continue treatment at this time.    DISCHARGE PLANNING  Expected Disposition Plan: Home or Self Care    NEED FOR CONTINUED HOSPITALIZATION  Psychiatric illness continues to pose a potential threat to life or bodily function, of self or others, thereby requiring the need for continued inpatient psychiatric hospitalization: Yes, due to: significant psychotic thought disorder, as evidenced by:  Ongoing concerns with grave disability with patient unable to perform basic feeding, hygiene and  dressing activities without significant constant support.    Protective inpatient pyschiatric hospitalization required while a safe disposition plan is enacted: Yes    Patient stabilized and ready for discharge from inpatient psychiatric unit: No      STAFF:   Erick Valera NP  Psychiatry

## 2023-11-02 PROCEDURE — 11400000 HC PSYCH PRIVATE ROOM

## 2023-11-02 PROCEDURE — 99232 SBSQ HOSP IP/OBS MODERATE 35: CPT | Mod: ,,, | Performed by: PSYCHIATRY & NEUROLOGY

## 2023-11-02 PROCEDURE — 90833 PSYTX W PT W E/M 30 MIN: CPT | Mod: ,,, | Performed by: PSYCHIATRY & NEUROLOGY

## 2023-11-02 PROCEDURE — 25000003 PHARM REV CODE 250: Performed by: INTERNAL MEDICINE

## 2023-11-02 PROCEDURE — 90833 PR PSYCHOTHERAPY W/PATIENT W/E&M, 30 MIN (ADD ON): ICD-10-PCS | Mod: ,,, | Performed by: PSYCHIATRY & NEUROLOGY

## 2023-11-02 PROCEDURE — 99232 PR SUBSEQUENT HOSPITAL CARE,LEVL II: ICD-10-PCS | Mod: ,,, | Performed by: PSYCHIATRY & NEUROLOGY

## 2023-11-02 PROCEDURE — S4991 NICOTINE PATCH NONLEGEND: HCPCS | Performed by: INTERNAL MEDICINE

## 2023-11-02 PROCEDURE — 25000003 PHARM REV CODE 250: Performed by: PSYCHIATRY & NEUROLOGY

## 2023-11-02 RX ORDER — ELECTROLYTES/DEXTROSE
SOLUTION, ORAL ORAL 3 TIMES DAILY
Status: DISCONTINUED | OUTPATIENT
Start: 2023-11-02 | End: 2023-11-08 | Stop reason: HOSPADM

## 2023-11-02 RX ORDER — OLANZAPINE 2.5 MG/1
2.5 TABLET ORAL ONCE
Status: COMPLETED | OUTPATIENT
Start: 2023-11-02 | End: 2023-11-02

## 2023-11-02 RX ADMIN — ACETAMINOPHEN 650 MG: 325 TABLET ORAL at 10:11

## 2023-11-02 RX ADMIN — NICOTINE 1 PATCH: 14 PATCH, EXTENDED RELEASE TRANSDERMAL at 08:11

## 2023-11-02 RX ADMIN — OLANZAPINE 2.5 MG: 2.5 TABLET, FILM COATED ORAL at 01:11

## 2023-11-02 RX ADMIN — OLANZAPINE 10 MG: 10 TABLET, FILM COATED ORAL at 08:11

## 2023-11-02 RX ADMIN — HYDROCORTISONE: 0.01 CREAM TOPICAL at 08:11

## 2023-11-02 RX ADMIN — HYDROXYZINE PAMOATE 50 MG: 50 CAPSULE ORAL at 08:11

## 2023-11-02 RX ADMIN — HYDROCORTISONE: 0.01 CREAM TOPICAL at 04:11

## 2023-11-02 RX ADMIN — FLUOXETINE 10 MG: 10 CAPSULE ORAL at 08:11

## 2023-11-02 NOTE — PLAN OF CARE
11/02/23 1116   Step 1: Warning Signs   Warning Sign 1 fear   Warning Sign 2 panick   Warning Sign 3 very alert   Step 2: Internal coping strategies - Things I can do to take my mind off my problems without contacting another person:   Coping Strategy 1 draw   Coping Strategy 2 paint   Coping Strategy 3 smoke cigarettes   Step 3: People and social settings that provide distraction:   1. Name Saul Moore (friend)       Phone [phone]   2. Name Karis Gonzalez (friend)       Phone [phone]   3. Place feed animals at the house (outdoors)   4. Place fishing/soccer    Step 4: People whom I can ask for help:   1. Person Saul Moore       Phone [phone]   2. Person n/a   3. Person n/a   Step 5: Professionals or agencies I can contact during a crisis:   1. Clinician Name [facility]       Phone [phone]   3. Suicide Prevention Lifeline: 988 Suicide Prevention Lifeline 988   4. Local Emergency Service       911   Step 6: Making the environment safer (plan for lethal means safety)   Safe Environment Plan ignore the voices, call 911, take medication, not use cocaine   Safe Environment Optional: What is most important to me and worth living for? my family, my health   Safety Plan Tasks   Provided a Hard Copy to the Patient Y   Explained How to Follow the Steps Y   Discussed Facilitators and Barriers Y

## 2023-11-02 NOTE — NURSING
POC reviewed and is ongoing.  Pt calm and cooperative on unit, no meds due this shift.  Pt interacts well with peers and is pleasant and appropriate with staff.  Pt denies SI/HI.

## 2023-11-02 NOTE — PLAN OF CARE
Behavioral Health Unit  Psychosocial History and Assessment  Progress Note      Patient Name: Emre Enriquez YOB: 1985 SW: Mariusz Perrin, FROYLAN  Date: 11/2/2023    Chief Complaint: psychosis    Consent:     Did the patient consent for an interview with the ? Yes    Did the patient consent for the  to contact family/friend/caregiver?   Yes  Name: Saul Moore, Relationship: friend, and Contact: [phone]    Did the patient give consent for the  to inform family/friend/caregiver of his/her whereabouts or to discuss discharge planning? Yes    Source of Information: Face to face with patient and Telephone interview with family/friend/caregiver    Is information obtained from interviews considered reliable?   yes    Reason for Admission:     Active Hospital Problems    Diagnosis  POA    *Psychosis [F29]  Yes      Resolved Hospital Problems   No resolved problems to display.       History of Present Illness - (Patient Perception):   I was hearing voices and for them to stop I wanted to do something to myself, no specific plan.     History of Present Illness - (Perception of Others): He said he was hearing voices that was talking to him they was telling him to turn himself in. He said he was scared and he was having panic attacks and anxiety. I believe its a combination of things he may be dealing with. He hasn't been working much lately, work has been slow, he's been having too much time on his hand and entertaining the thoughts. He's been neglecting his animals ccording to Saul Dunlapueroa    Present biopsychosocial functioning: Per MD Note, Pt is a 38 y.o. male with a past psychiatric history of psychosis currently admitted to the inpatient unit with the following chief complaint: psychosis. Pt UDS was positive for cocaine. Pt reports AH. Pt denies VH/SI/HI. Pt is legally . Pt lives with a friend. Pt is employed. Pt can perform all ADLs. Pt  "reports recent stressors as conflict with family and little to no work.     Past biopsychosocial functioning: Pt reports 1 previous inpatient treatment;  less than two months ago. Pt denies previous suicide attempts. Pt denies previous outpatient treatments.      Family and Marital/Relationship History:     Significant Other/Partner Relationships:      Family Relationships: Currently strained       Childhood History:     Where was patient raised? [place]     Who raised the patient? Parents       How does patient describe their childhood? Happy      Who is patient's primary support person? Friend, Aurora Oscar      Culture and Pentecostalism:     Pentecostalism: No Pentecostalism    How strong of a role does Rastafarian and spirituality play in patient's life? "That's my refuge when I'm weak."    Judaism or spiritual concerns regarding treatment: not applicable     History of Abuse:   History of Abuse: Denies    Psychiatric and Medical History:     History of psychiatric illness or treatment: prior inpatient treatment    Medical history:   Past Medical History:   Diagnosis Date    History of psychiatric treatment        Substance Abuse History:     Alcohol - (Patient Perspective):   Social History     Substance and Sexual Activity   Alcohol Use Yes    Comment: occ       Alcohol - (Collateral Perspective): drinks beer often during the week according to Saul Moore     Drugs - (Patient Perspective):   Social History     Substance and Sexual Activity   Drug Use Yes    Types: Cocaine       Drugs - (Collateral Perspective): Smokes crystal meth according to Saul Moore      Education:     Currently Enrolled? No  Grade School (K-8)    Special Education? No    Interested in Completing Education/GED: No    Employment and Financial:     Currently employed? Unemployed     Source of Income:  none     Able to afford basic needs (food, shelter, utilities)? Friend is currently providing basic needs     Who manages " finances/personal affairs? N/A      Service:     [place]? no    Combat Service? No     Community Resources:     Describe present use of community resources: None reported      Identify previously used community resources   (Include previous mental health treatment - outpatient and inpatient): Pt reports 1 previous inpatient less than 1 month ago     Environmental:     Current living situation:Lives with friend, Lives in home    Social Evaluation:     Patient Assets: Capable of independent living and Work skills    Patient Limitations: lacks insight into illness, substance abuse     High risk psychosocial issues that may impact discharge planning:   Minimization of substance abuse     Recommendations:     Anticipated discharge plan:   outpatient follow up    High risk issues requiring early treatment planning and immediate intervention: psychosis     Community resources needed for discharge planning:  aftercare treatment sources    Anticipated social work role(s) in treatment and discharge planning: SW will facilitate process groups to assist pt develop healthy coping skills; CM will arrange outpatient follow-up appointments and assist with discharge planning.

## 2023-11-02 NOTE — PLAN OF CARE
Problem: Adult Behavioral Health Plan of Care  Goal: Plan of Care Review  Outcome: Ongoing, Progressing  Goal: Patient-Specific Goal (Individualization)  Outcome: Ongoing, Progressing  Goal: Adheres to Safety Considerations for Self and Others  Outcome: Ongoing, Progressing  Goal: Absence of New-Onset Illness or Injury  Outcome: Ongoing, Progressing  Goal: Optimized Coping Skills in Response to Life Stressors  Outcome: Ongoing, Progressing  Goal: Develops/Participates in Therapeutic [place] to Support Successful Transition  Outcome: Ongoing, Progressing     Problem: Behavior Regulation Impairment (Psychotic Signs/Symptoms)  Goal: Improved Behavioral Control (Psychotic Signs/Symptoms)  Outcome: Ongoing, Progressing     Problem: Cognitive Impairment (Psychotic Signs/Symptoms)  Goal: Optimal Cognitive Function (Psychotic Signs/Symptoms)  Outcome: Ongoing, Progressing     Problem: Decreased Participation and Engagement (Psychotic Signs/Symptoms)  Goal: Increased Participation and Engagement (Psychotic Signs/Symptoms)  Outcome: Ongoing, Progressing     Problem: Mood Impairment (Psychotic Signs/Symptoms)  Goal: Improved Mood Symptoms (Psychotic Signs/Symptoms)  Outcome: Ongoing, Progressing     Problem: Psychomotor Impairment (Psychotic Signs/Symptoms)  Goal: Improved Psychomotor Symptoms (Psychotic Signs/Symptoms)  Outcome: Ongoing, Progressing     Problem: Sensory Perception Impairment (Psychotic Signs/Symptoms)  Goal: Decreased Sensory Symptoms (Psychotic Signs/Symptoms)  Outcome: Ongoing, Progressing     Problem: Sleep Disturbance (Psychotic Signs/Symptoms)  Goal: Improved Sleep (Psychotic Signs/Symptoms)  Outcome: Ongoing, Progressing     Problem: Social, Occupational or Functional Impairment (Psychotic Signs/Symptoms)  Goal: Enhanced Social, Occupational or Functional Skills (Psychotic Signs/Symptoms)  Outcome: Ongoing, Progressing     Problem: Activity and Energy Impairment (Anxiety Signs/Symptoms)  Goal:  Optimized Energy Level (Anxiety Signs/Symptoms)  Outcome: Ongoing, Progressing     Problem: Cognitive Impairment (Anxiety Signs/Symptoms)  Goal: Optimized Cognitive Function (Anxiety Signs/Symptoms)  Outcome: Ongoing, Progressing     Problem: Mood Impairment (Anxiety Signs/Symptoms)  Goal: Improved Mood Symptoms (Anxiety Signs/Symptoms)  Outcome: Ongoing, Progressing     Problem: Sleep Impairment (Anxiety Signs/Symptoms)  Goal: Improved Sleep (Anxiety Signs/Symptoms)  Outcome: Ongoing, Progressing     Problem: Social, Occupational or Functional Impairment (Anxiety Signs/Symptoms)  Goal: Enhanced Social, Occupational or Functional Skills (Anxiety Signs/Symptoms)  Outcome: Ongoing, Progressing     Problem: Somatic Disturbance (Anxiety Signs/Symptoms)  Goal: Improved Somatic Symptoms (Anxiety Signs/Symptoms)  Outcome: Ongoing, Progressing     Problem: Activity and Energy Impairment (Excessive Substance Use)  Goal: Optimized Energy Level (Excessive Substance Use)  Outcome: Ongoing, Progressing     Problem: Behavior Regulation Impairment (Excessive Substance Use)  Goal: Improved Behavioral Control (Excessive Substance Use)  Outcome: Ongoing, Progressing     Problem: Decreased Participation and Engagement (Excessive Substance Use)  Goal: Increased Participation and Engagement (Excessive Substance Use)  Outcome: Ongoing, Progressing     Problem: Physiologic Impairment (Excessive Substance Use)  Goal: Improved Physiologic Symptoms (Excessive Substance Use)  Outcome: Ongoing, Progressing     Problem: Social, Occupational or Functional Impairment (Excessive Substance Use)  Goal: Enhanced Social, Occupational or Functional Skills (Excessive Substance Use)  Outcome: Ongoing, Progressing     Problem: Activity and Energy Impairment (Depressive Signs/Symptoms)  Goal: Optimized Energy Level (Depressive Signs/Symptoms)  Outcome: Ongoing, Progressing     Problem: Cognitive Impairment (Depressive Signs/Symptoms)  Goal: Optimized  Cognitive Function  Outcome: Ongoing, Progressing     Problem: Decreased Participation/Engagement (Depressive Signs/Symptoms)  Goal: Increased Participation and Engagement (Depressive Signs/Symptoms)  Outcome: Ongoing, Progressing     Problem: Feelings of Worthlessness, Hopelessness or Excessive Guilt (Depressive Signs/Symptoms)  Goal: Enhanced Self-Esteem and Confidence (Depressive Signs/Symptoms)  Outcome: Ongoing, Progressing     Problem: Mood Impairment (Depressive Signs/Symptoms)  Goal: Improved Mood Symptoms (Depressive Signs/Symptoms)  Outcome: Ongoing, Progressing     Problem: Nutrition Imbalance (Depressive Signs/Symptoms)  Goal: Optimized Nutrition Intake  Outcome: Ongoing, Progressing     Problem: Psychomotor Impairment (Depressive Signs/Symptoms)  Goal: Improved Psychomotor Symptoms (Depressive Signs/Symptoms)  Outcome: Ongoing, Progressing     Problem: Sleep Disturbance (Depressive Signs/Symptoms)  Goal: Improved Sleep (Depressive Signs/Symptoms)  Outcome: Ongoing, Progressing     Problem: Social, Occupational or Functional Impairment (Depressive Signs/Symptoms)  Goal: Enhanced Social, Occupational or Functional Skills (Depressive Signs/Symptoms)  Outcome: Ongoing, Progressing

## 2023-11-02 NOTE — PLAN OF CARE
POC reviewed this shift and is on going. Patient is withdrawn, depressed, anxious, and paranoid. Endorses Auditory Hallucinations. Denies Suicidal Ideation, Homicidal Ideation, Visual Hallucinations, Tactile Hallucinations, Gustatory Hallucinations, and Delusions. Limited interactions with peers primarily due to language barrier. Self isolating in his room. AH's on going. Pt continues to be medication compliant and staff will continue to monitor pt closely while on the unit.

## 2023-11-02 NOTE — PLAN OF CARE
Collateral: Saul Moore, friend, [phone]    Collateral Perception of Problem:   He said he was hearing voices that was talking to him they was telling him to turn himself in   He said he was scared he was having panic attacks and anxiety     Previous Psych History/Hospitalizations:  1 previous inpatient about 3 weeks ago     Suicide Attempts (how/severity):  None     How long has pt had problems (childhood dx?):  As soon as he smokes crystal meth he tends to act out   At least every other night     Impulse issues:  Watches tv with no volume   He would cook and then would not want to eat his own food    History of violence:   none    Drug Use:  Smokes crystal meth     Alcohol Use:  Drinks beer often during the week    Legal Issues:  None     Other Pertinent Info:   Some of it has to be self induced from smoking cigars, cigarettes, and he smokes crystal meth   He hasn't been working much lately, working has been slow, been having too much time on his hand, entertaining the thoughts   He's been neglecting his animals   Spends a lot of time by himself   Believe its a combination of things he may be dealing with   He has been going to [employer] now, during the week and weekends     Baseline:  Usually busy and can control his substance use     Discharge Plan:  Home   I'm willing to help take care of him

## 2023-11-02 NOTE — PROGRESS NOTES
"PSYCHIATRY DAILY INPATIENT PROGRESS NOTE  SUBSEQUENT HOSPITAL VISIT    ENCOUNTER DATE: 11/2/2023  SITE: [facility]    DATE OF ADMISSION: 10/31/2023  8:53 AM  LENGTH OF STAY: 2 days    CHIEF COMPLAINT   Emre Enriquez is a 38 y.o. male, seen during daily abel rounds on the inpatient unit.  Emre Enriquez presented with the chief complaint of psychosis    The patient was seen and examined. The chart was reviewed.     Reviewed notes from Rns and MD and labs from the last 24 hours.    The patient's case was discussed with the treatment team/care providers today including Rns and MD    Staff reports no behavioral or management issues.     The patient has been compliant with treatment.    Subjective 11/02/2023    Patient assessed today with assistance from hospital  line.    Patient continues to endorse intermittent auditory hallucinations, states that he has been "hearing people call my name, but when I turn around there's no one there." State these have decreased in frequency to a small extent. Continues to deny SI/HI. Denies cravings or withdrawal symptoms. Continues to c/o itching, staff notes that some erythema is once again present to patient's l trunk.     Reports sleep was "good".     The patient denies any side effects to medications.    Psychiatric ROS (observed, reported, or endorsed/denied):  Depressed mood - No  Interest/pleasure/anhedonia: No  Guilt/hopelessness/worthlessness - No  Changes in Sleep - No  Changes in Appetite - No  Changes in Concentration - No  Changes in Energy - No  PMA/R- No  Suicidal- active/passive ideations - No  Homicidal ideations: active/passive ideations - No    Hallucinations - fluctuating  Delusions - No  Disorganized behavior - No  Disorganized speech - No  Negative symptoms - No    Elevated mood - No  Decreased need for sleep - No  Grandiosity - No  Racing thoughts - No  Impulsivity - No  Irritability- No  Increased energy - No  Distractibility - " No  Increase in goal-directed activity or PMA- No    Symptoms of POLLY - No  Symptoms of Panic Disorder- No  Symptoms of PTSD - No    Overall progress: Patient is showing mild improvement     Psychotherapy:  Target symptoms: substance abuse, psychosis  Why chosen therapy is appropriate versus another modality: relevant to diagnosis, evidence based practice  Outcome monitoring methods: self-report, observation  Therapeutic intervention type: insight oriented psychotherapy, supportive psychotherapy  Topics discussed/themes: building skills sets for symptom management, symptom recognition, substance abuse  The patient's response to the intervention is accepting. The patient's progress toward treatment goals is fair.   Duration of intervention: 16 minutes.    Medical ROS  Review of Systems   Constitutional: Negative.    HENT: Negative.     Eyes: Negative.    Respiratory: Negative.     Cardiovascular: Negative.    Gastrointestinal: Negative.    Genitourinary: Negative.    Musculoskeletal: Negative.    Skin:  Positive for itching.   Neurological: Negative.    Endo/Heme/Allergies: Negative.    Psychiatric/Behavioral:  Positive for hallucinations and substance abuse.        PAST MEDICAL HISTORY   Past Medical History:   Diagnosis Date    History of psychiatric treatment        PSYCHOTROPIC MEDICATIONS   Scheduled Meds:   FLUoxetine  10 mg Oral Daily    OLANZapine  10 mg Oral Daily     Continuous Infusions:  PRN Meds:.acetaminophen, aluminum-magnesium hydroxide-simethicone, benzonatate, benztropine mesylate, hydrOXYzine pamoate, loperamide, nicotine, OLANZapine **AND** OLANZapine, ondansetron, promethazine    EXAMINATION    VITALS   Vitals:    10/31/23 1909 11/01/23 0737 11/01/23 1911 11/02/23 0725   BP: 104/78 115/73 117/74 113/74   BP Location: Left arm Left arm Left arm Left arm   Patient Position: Sitting Sitting Sitting Sitting   Pulse: 76 68 76 66   Resp: 16 20 20 20   Temp: 98.6 °F (37 °C) 97.8 °F (36.6 °C) 98.2 °F  (36.8 °C) 98.4 °F (36.9 °C)   TempSrc: Oral Oral Oral Oral   SpO2: 99% 100% 99% 99%   Weight:       Height:           Body mass index is 22.45 kg/m².    CONSTITUTIONAL  General Appearance: unremarkable, age appropriate, normal weight    MUSCULOSKELETAL  Muscle Strength and Tone:no tremor, no tic  Abnormal Involuntary Movements: No  Gait and Station: non-ataxic    PSYCHIATRIC   Level of Consciousness: awake, alert , and oriented  Orientation: person, place, time, and situation  Grooming: Casually dressed and Well groomed  Psychomotor Behavior: normal, cooperative  Speech: normal tone, normal rate, normal pitch, normal volume  Language: grossly intact  Mood: fine  Affect: Blunted  Thought Process: linear, logical  Associations: intact   Thought Content: DENIES suicidal ideation, DENIES homicidal ideation, and no delusions  Perceptions: hallucinations:  auditory  Memory: Able to recall past events, Remote intact, and Recent intact  Attention:Attends to interview without distraction  Fund of Knowledge: Aware of current events and Vocabulary appropriate   Estimate if Intelligence:  Average based on work/education history, vocabulary and mental status exam  Insight: has awareness of illness  Judgment: behavior is adequate to circumstances    DIAGNOSTIC TESTING   Laboratory Results  No results found for this or any previous visit (from the past 24 hour(s)).      MEDICAL DECISION MAKING   ASSESSMENT      Schizophrenia, chronic with acute exacerbation, severe  Unspecified mood disorder  Unspecified Anxiety Disorder     Cocaine Use disorder  Nicotine Dependence      Psychosocial stressors        PROBLEM LIST AND MANAGEMENT PLANS     Psychosis: pt counseled  -Zyprexa increased to 10 mg qday -Increase to 12.5 mg  -Likely substance-induced     Mood: pt counseled   -start trial of prozac 10 mg po q day     Anxiety: pt counseled  -meds as above  -start vistaril 50 mg po q 6 hours prn     Nicotine Dependence: pt counseled  -start  nicotine 14 mg patch dermal daily     Cocaine use: pt counseled  -seek rehab if willing     Psychosocial stressors: pt counseled  -SW consulted to assist wit resources   Discussed diagnosis, risks and benefits of proposed treatment vs alternative treatments vs no treatment, potential side effects of these treatments and the inherent unpredictability of treatment. The patient expresses understanding of the above and displays the capacity to agree with this treatment given said understanding. Patient also agrees that, currently, the benefits outweigh the risks and would like to pursue/continue treatment at this time.    DISCHARGE PLANNING  Expected Disposition Plan: Home or Self Care    NEED FOR CONTINUED HOSPITALIZATION  Psychiatric illness continues to pose a potential threat to life or bodily function, of self or others, thereby requiring the need for continued inpatient psychiatric hospitalization: Yes, due to: significant psychotic thought disorder, as evidenced by:  Ongoing concerns with grave disability with patient unable to perform basic feeding, hygiene and dressing activities without significant constant support.    Protective inpatient pyschiatric hospitalization required while a safe disposition plan is enacted: Yes    Patient stabilized and ready for discharge from inpatient psychiatric unit: No      STAFF:   Erick Valera NP  Psychiatry

## 2023-11-03 PROCEDURE — 90833 PSYTX W PT W E/M 30 MIN: CPT | Mod: ,,, | Performed by: PSYCHIATRY & NEUROLOGY

## 2023-11-03 PROCEDURE — 99231 SBSQ HOSP IP/OBS SF/LOW 25: CPT | Mod: ,,, | Performed by: STUDENT IN AN ORGANIZED HEALTH CARE EDUCATION/TRAINING PROGRAM

## 2023-11-03 PROCEDURE — S4991 NICOTINE PATCH NONLEGEND: HCPCS | Performed by: INTERNAL MEDICINE

## 2023-11-03 PROCEDURE — 99231 PR SUBSEQUENT HOSPITAL CARE,LEVL I: ICD-10-PCS | Mod: ,,, | Performed by: STUDENT IN AN ORGANIZED HEALTH CARE EDUCATION/TRAINING PROGRAM

## 2023-11-03 PROCEDURE — 99232 SBSQ HOSP IP/OBS MODERATE 35: CPT | Mod: ,,, | Performed by: PSYCHIATRY & NEUROLOGY

## 2023-11-03 PROCEDURE — 90833 PR PSYCHOTHERAPY W/PATIENT W/E&M, 30 MIN (ADD ON): ICD-10-PCS | Mod: ,,, | Performed by: PSYCHIATRY & NEUROLOGY

## 2023-11-03 PROCEDURE — 25000003 PHARM REV CODE 250: Performed by: INTERNAL MEDICINE

## 2023-11-03 PROCEDURE — 25000003 PHARM REV CODE 250: Performed by: PSYCHIATRY & NEUROLOGY

## 2023-11-03 PROCEDURE — 99232 PR SUBSEQUENT HOSPITAL CARE,LEVL II: ICD-10-PCS | Mod: ,,, | Performed by: PSYCHIATRY & NEUROLOGY

## 2023-11-03 PROCEDURE — 11400000 HC PSYCH PRIVATE ROOM

## 2023-11-03 RX ADMIN — ACETAMINOPHEN 650 MG: 325 TABLET ORAL at 12:11

## 2023-11-03 RX ADMIN — HYDROCORTISONE: 0.01 CREAM TOPICAL at 09:11

## 2023-11-03 RX ADMIN — HYDROCORTISONE: 0.01 CREAM TOPICAL at 02:11

## 2023-11-03 RX ADMIN — NICOTINE 1 PATCH: 14 PATCH, EXTENDED RELEASE TRANSDERMAL at 09:11

## 2023-11-03 RX ADMIN — HYDROCORTISONE 1 TUBE: 0.01 CREAM TOPICAL at 08:11

## 2023-11-03 RX ADMIN — ACETAMINOPHEN 650 MG: 325 TABLET ORAL at 08:11

## 2023-11-03 RX ADMIN — FLUOXETINE 10 MG: 10 CAPSULE ORAL at 09:11

## 2023-11-03 RX ADMIN — OLANZAPINE 12.5 MG: 10 TABLET, FILM COATED ORAL at 09:11

## 2023-11-03 NOTE — PLAN OF CARE
POC reviewed this shift and is ongoing.  Pt cooperative with staff and is interacts with peers. Pt able to make needs known to staff. Med compliant with no adverse reaction. Will continue to monitor for changes.

## 2023-11-03 NOTE — PSYCH
POC reviewed and is ongoing. Pt is medication compliant, denies S/HI and A/VH. Pt is anxious and pacing back and forward on the unit, shows ss of anxiety. Pt sitting in dining area watching television and coloring a majority of the day. Pt is medication compliant, cooperative and calm. Will continue to monitor to ensure pt's health and safety while on the unit.

## 2023-11-03 NOTE — PROGRESS NOTES
PSYCHIATRY DAILY INPATIENT PROGRESS NOTE  SUBSEQUENT HOSPITAL VISIT    ENCOUNTER DATE: 11/3/2023  SITE: [facility]    DATE OF ADMISSION: 10/31/2023  8:53 AM  LENGTH OF STAY: 3 days    CHIEF COMPLAINT   Emre Enriquez is a 38 y.o. male, seen during daily abel rounds on the inpatient unit.  Emre Enriquez presented with the chief complaint of psychosis    The patient was seen and examined. The chart was reviewed.     Reviewed notes from Rns and MD and labs from the last 24 hours.    The patient's case was discussed with the treatment team/care providers today including Rns and MD    Staff reports no behavioral or management issues.     The patient has been compliant with treatment.    Subjective 11/03/2023    Patient assessed today with assistance from hospital  line.    Patient continues to endorse auditory hallucinations.  He states that they are louder today compared to yesterday and are telling me to turn myself an, but I do not know who to turn myself in to.  Reports last incidence of hallucination was last night.  Denies active hallucinations at this time, however he states they come and go.    Patient reports mood is good, affect is appropriate, calm, pleasant.  No acute distress is observed.  Continues to complain of daily headaches which are relieved by Tylenol.  Patient endorses good sleep last night.  He is amenable to further titration of Zyprexa and states he feels he needs to be in the hospital in till the voices go away.    Psychiatric ROS (observed, reported, or endorsed/denied):  Depressed mood - No  Interest/pleasure/anhedonia: No  Guilt/hopelessness/worthlessness - No  Changes in Sleep - No  Changes in Appetite - No  Changes in Concentration - No  Changes in Energy - No  PMA/R- No  Suicidal- active/passive ideations - No  Homicidal ideations: active/passive ideations - No    Hallucinations - endorses  Delusions - No  Disorganized behavior - No  Disorganized  speech - No  Negative symptoms - No    Elevated mood - No  Decreased need for sleep - No  Grandiosity - No  Racing thoughts - No  Impulsivity - No  Irritability- No  Increased energy - No  Distractibility - No  Increase in goal-directed activity or PMA- No    Symptoms of POLLY - No  Symptoms of Panic Disorder- No  Symptoms of PTSD - No    Overall progress: Patient is showing mild improvement     Psychotherapy:  Target symptoms: substance abuse, psychosis  Why chosen therapy is appropriate versus another modality: relevant to diagnosis, evidence based practice  Outcome monitoring methods: self-report, observation  Therapeutic intervention type: insight oriented psychotherapy, supportive psychotherapy  Topics discussed/themes: building skills sets for symptom management, symptom recognition, substance abuse  The patient's response to the intervention is accepting. The patient's progress toward treatment goals is fair.   Duration of intervention: 16 minutes.    Medical ROS  Review of Systems   Constitutional: Negative.    HENT: Negative.     Eyes: Negative.    Respiratory: Negative.     Cardiovascular: Negative.    Gastrointestinal: Negative.    Genitourinary: Negative.    Musculoskeletal: Negative.    Skin:  Positive for itching.   Neurological: Negative.    Endo/Heme/Allergies: Negative.    Psychiatric/Behavioral:  Positive for hallucinations and substance abuse.        PAST MEDICAL HISTORY   Past Medical History:   Diagnosis Date    History of psychiatric treatment        PSYCHOTROPIC MEDICATIONS   Scheduled Meds:   FLUoxetine  10 mg Oral Daily    hydrocortisone-aloe vera   Topical (Top) TID    OLANZapine  12.5 mg Oral Daily     Continuous Infusions:  PRN Meds:.acetaminophen, aluminum-magnesium hydroxide-simethicone, benzonatate, benztropine mesylate, hydrOXYzine pamoate, loperamide, nicotine, OLANZapine **AND** OLANZapine, ondansetron, promethazine    EXAMINATION    VITALS   Vitals:    11/01/23 1911 11/02/23 0725  11/02/23 1913 11/03/23 0749   BP: 117/74 113/74 109/72 121/77   BP Location: Left arm Left arm Left arm Left arm   Patient Position: Sitting Sitting Sitting Sitting   Pulse: 76 66 71 65   Resp: 20 20 16 18   Temp: 98.2 °F (36.8 °C) 98.4 °F (36.9 °C) 98.7 °F (37.1 °C) 98.8 °F (37.1 °C)   TempSrc: Oral Oral Oral Oral   SpO2: 99% 99% 98% 100%   Weight:       Height:           Body mass index is 22.45 kg/m².    CONSTITUTIONAL  General Appearance: unremarkable, age appropriate, normal weight    MUSCULOSKELETAL  Muscle Strength and Tone:no tremor, no tic  Abnormal Involuntary Movements: No  Gait and Station: non-ataxic    PSYCHIATRIC   Level of Consciousness: awake, alert , and oriented  Orientation: person, place, time, and situation  Grooming: Casually dressed and Well groomed  Psychomotor Behavior: normal, cooperative  Speech: normal tone, normal rate, normal pitch, normal volume  Language: grossly intact  Mood: fine  Affect: Blunted  Thought Process: linear, logical  Associations: intact   Thought Content: DENIES suicidal ideation, DENIES homicidal ideation, and no delusions  Perceptions: hallucinations:  auditory  Memory: Able to recall past events, Remote intact, and Recent intact  Attention:Attends to interview without distraction  Fund of Knowledge: Aware of current events and Vocabulary appropriate   Estimate if Intelligence:  Average based on work/education history, vocabulary and mental status exam  Insight: has awareness of illness  Judgment: behavior is adequate to circumstances    DIAGNOSTIC TESTING   Laboratory Results  No results found for this or any previous visit (from the past 24 hour(s)).      MEDICAL DECISION MAKING   ASSESSMENT      Schizophrenia, chronic with acute exacerbation, severe  Unspecified mood disorder  Unspecified Anxiety Disorder     Cocaine Use disorder  Nicotine Dependence      Psychosocial stressors        PROBLEM LIST AND MANAGEMENT PLANS     Psychosis: pt counseled  -Zyprexa  increased to 10 mg qday -Increase to 12.5 mg today  -increased to 15 mg per day tomorrow-Likely substance-induced     Mood: pt counseled   -continue trial of prozac 10 mg po q day     Anxiety: pt counseled  -meds as above  -start vistaril 50 mg po q 6 hours prn     Nicotine Dependence: pt counseled  -start nicotine 14 mg patch dermal daily     Cocaine use: pt counseled  -seek rehab if willing     Psychosocial stressors: pt counseled  -SW consulted to assist wit resources   Discussed diagnosis, risks and benefits of proposed treatment vs alternative treatments vs no treatment, potential side effects of these treatments and the inherent unpredictability of treatment. The patient expresses understanding of the above and displays the capacity to agree with this treatment given said understanding. Patient also agrees that, currently, the benefits outweigh the risks and would like to pursue/continue treatment at this time.    DISCHARGE PLANNING  Expected Disposition Plan: Home or Self Care    NEED FOR CONTINUED HOSPITALIZATION  Psychiatric illness continues to pose a potential threat to life or bodily function, of self or others, thereby requiring the need for continued inpatient psychiatric hospitalization: Yes, due to: significant psychotic thought disorder, as evidenced by:  Ongoing concerns with grave disability with patient unable to perform basic feeding, hygiene and dressing activities without significant constant support.    Protective inpatient pyschiatric hospitalization required while a safe disposition plan is enacted: Yes    Patient stabilized and ready for discharge from inpatient psychiatric unit: No      STAFF:   Erick Valera NP  Psychiatry

## 2023-11-04 PROBLEM — L30.9 DERMATITIS: Status: ACTIVE | Noted: 2023-11-04

## 2023-11-04 PROCEDURE — 25000003 PHARM REV CODE 250: Performed by: PSYCHIATRY & NEUROLOGY

## 2023-11-04 PROCEDURE — 25000003 PHARM REV CODE 250: Performed by: INTERNAL MEDICINE

## 2023-11-04 PROCEDURE — 11400000 HC PSYCH PRIVATE ROOM

## 2023-11-04 PROCEDURE — 99233 SBSQ HOSP IP/OBS HIGH 50: CPT | Mod: ,,, | Performed by: PSYCHIATRY & NEUROLOGY

## 2023-11-04 PROCEDURE — S4991 NICOTINE PATCH NONLEGEND: HCPCS | Performed by: INTERNAL MEDICINE

## 2023-11-04 PROCEDURE — 99233 PR SUBSEQUENT HOSPITAL CARE,LEVL III: ICD-10-PCS | Mod: ,,, | Performed by: PSYCHIATRY & NEUROLOGY

## 2023-11-04 RX ORDER — FLUOXETINE HYDROCHLORIDE 20 MG/1
20 CAPSULE ORAL DAILY
Status: DISCONTINUED | OUTPATIENT
Start: 2023-11-05 | End: 2023-11-08 | Stop reason: HOSPADM

## 2023-11-04 RX ADMIN — OLANZAPINE 12.5 MG: 10 TABLET, FILM COATED ORAL at 08:11

## 2023-11-04 RX ADMIN — HYDROCORTISONE 1 TUBE: 0.01 CREAM TOPICAL at 08:11

## 2023-11-04 RX ADMIN — ACETAMINOPHEN 650 MG: 325 TABLET ORAL at 03:11

## 2023-11-04 RX ADMIN — FLUOXETINE 10 MG: 10 CAPSULE ORAL at 08:11

## 2023-11-04 RX ADMIN — HYDROCORTISONE: 0.01 CREAM TOPICAL at 03:11

## 2023-11-04 RX ADMIN — HYDROCORTISONE: 0.01 CREAM TOPICAL at 08:11

## 2023-11-04 RX ADMIN — NICOTINE 1 PATCH: 14 PATCH, EXTENDED RELEASE TRANSDERMAL at 08:11

## 2023-11-04 NOTE — ASSESSMENT & PLAN NOTE
Per patient itchiness started two days ago. Today he was started on topical steroids TID.  If no resolution, then consider adding antifungal agent for ringworm treatment. Keep skin clean and dry with non fragrant moisturizer.

## 2023-11-04 NOTE — SUBJECTIVE & OBJECTIVE
Interval History: Patient seen and examined.     Review of Systems   Constitutional:  Negative for activity change, appetite change, chills, fatigue and fever.   Gastrointestinal:  Negative for abdominal pain, constipation, diarrhea, nausea and vomiting.   Musculoskeletal:  Negative for arthralgias.   Skin:  Positive for rash. Negative for color change and wound.   Neurological:  Negative for dizziness, numbness and headaches.   Psychiatric/Behavioral:  Negative for agitation, behavioral problems and confusion.      Objective:     Vital Signs (Most Recent):  Temp: 98.6 °F (37 °C) (11/03/23 1907)  Pulse: 72 (11/03/23 1907)  Resp: 18 (11/03/23 1907)  BP: 133/67 (11/03/23 1907)  SpO2: 99 % (11/03/23 1907) Vital Signs (24h Range):  Temp:  [98.6 °F (37 °C)-98.8 °F (37.1 °C)] 98.6 °F (37 °C)  Pulse:  [65-72] 72  Resp:  [18] 18  SpO2:  [99 %-100 %] 99 %  BP: (121-133)/(67-77) 133/67     Weight: 61.2 kg (134 lb 14.7 oz)  Body mass index is 22.45 kg/m².  No intake or output data in the 24 hours ending 11/04/23 0003      Physical Exam  Vitals and nursing note reviewed.   Constitutional:       General: He is not in acute distress.     Appearance: Normal appearance. He is not ill-appearing or toxic-appearing.   Abdominal:      General: Abdomen is flat. There is no distension.      Palpations: Abdomen is soft.      Tenderness: There is no abdominal tenderness. There is no guarding.   Skin:     General: Skin is warm and dry.      Findings: Rash present.      Comments: Dry scaly rash with mildly erythematous base over left flank across and abdomen and back, pruritic   Neurological:      Mental Status: He is alert.             Significant Labs: I have reviewed all pertinent imaging results/findings within the past 24 hours.    Significant Imaging: I have reviewed all pertinent imaging results/findings within the past 24 hours.

## 2023-11-04 NOTE — PROGRESS NOTES
"[facility] Hospital Medicine  Progress Note    Patient Name: Emre Enriquez  MRN: [id number]  Patient Class: IP- Psych   Admission Date: 10/31/2023  Length of Stay: 4 days  Attending Physician: Jay Rock MD  Primary Care Provider: Claribel, Primary Doctor        Subjective:     Principal Problem:Psychosis        HPI:  Chief Complaint   Patient presents with    Hallucinations       X3-4 days of auditory hallucinations. Pt with psych hx and currently not taking his meds for 5-6 days ago. The voices he hears is telling him to "turn himself in and I'm scared for my life." Pt denies SI or HI.        Emre Enriquez is a 38 y.o. male, with a past medical history of psychiatric illness, who presents to the ED with auditory hallucinations onset 3-4 days ago. Patient reports hearing voices telling him to "turn himself in" and "I'm scared for my life". He also notes believing people are out to get him. He notes not wanting to harm others, but is afraid of the extent of the voices. Patient reports consumption of EtOH and cocaine this weekend. Patient notes he stopped taking his psychiatric medication 5-6 days ago. He does not remember the name of the medication. No other exacerbating or alleviating factors. Patient denies suicidal ideation, fatigue, or other associated symptoms.          Overview/Hospital Course:  11/3 complains of rash over left flank across abdomen and back. Patient states he has had it for couple of days associated with itchiness. Denies use of new soaps or detergent. On palpation, non tender, no vesicular structures. Multiple uniform punctate raised lesions, scaly base over left flank with one focal circular proximal and midline axilla. Patient took a shower early morning and been given topical steroids to apply TID. If topical steroid does not clear lesion, then consider antifungal agent for ringworm therapy.       Interval History: Patient seen and " examined.     Review of Systems   Constitutional:  Negative for activity change, appetite change, chills, fatigue and fever.   Gastrointestinal:  Negative for abdominal pain, constipation, diarrhea, nausea and vomiting.   Musculoskeletal:  Negative for arthralgias.   Skin:  Positive for rash. Negative for color change and wound.   Neurological:  Negative for dizziness, numbness and headaches.   Psychiatric/Behavioral:  Negative for agitation, behavioral problems and confusion.      Objective:     Vital Signs (Most Recent):  Temp: 98.6 °F (37 °C) (11/03/23 1907)  Pulse: 72 (11/03/23 1907)  Resp: 18 (11/03/23 1907)  BP: 133/67 (11/03/23 1907)  SpO2: 99 % (11/03/23 1907) Vital Signs (24h Range):  Temp:  [98.6 °F (37 °C)-98.8 °F (37.1 °C)] 98.6 °F (37 °C)  Pulse:  [65-72] 72  Resp:  [18] 18  SpO2:  [99 %-100 %] 99 %  BP: (121-133)/(67-77) 133/67     Weight: 61.2 kg (134 lb 14.7 oz)  Body mass index is 22.45 kg/m².  No intake or output data in the 24 hours ending 11/04/23 0003      Physical Exam  Vitals and nursing note reviewed.   Constitutional:       General: He is not in acute distress.     Appearance: Normal appearance. He is not ill-appearing or toxic-appearing.   Abdominal:      General: Abdomen is flat. There is no distension.      Palpations: Abdomen is soft.      Tenderness: There is no abdominal tenderness. There is no guarding.   Skin:     General: Skin is warm and dry.      Findings: Rash present.      Comments: Dry scaly rash with mildly erythematous base over left flank across and abdomen and back, pruritic   Neurological:      Mental Status: He is alert.             Significant Labs: I have reviewed all pertinent imaging results/findings within the past 24 hours.    Significant Imaging: I have reviewed all pertinent imaging results/findings within the past 24 hours.      Assessment/Plan:      * Psychosis  To be admitted to our inpatient psychiatric unit for further evaluation and  management.        Dermatitis  Per patient itchiness started two days ago. Today he was started on topical steroids TID.  If no resolution, then consider adding antifungal agent for ringworm treatment. Keep skin clean and dry with non fragrant moisturizer.           VTE Risk Mitigation (From admission, onward)    None        Discharge Planning   CHANCE:      Code Status: Full Code   Is the patient medically ready for discharge?:     Reason for patient still in hospital (select all that apply): Treatment  Discharge Plan A: Rehab        Randolph Titus DO  Department of Hospital Medicine   [facility]

## 2023-11-04 NOTE — HOSPITAL COURSE
11/3 complains of rash over left flank across abdomen and back. Patient states he has had it for couple of days associated with itchiness. Denies use of new soaps or detergent. On palpation, non tender, no vesicular structures. Multiple uniform punctate raised lesions, scaly base over left flank with one focal circular proximal and midline axilla. Patient took a shower early morning and been given topical steroids to apply TID. If topical steroid does not clear lesion, then consider antifungal agent for ringworm therapy.

## 2023-11-04 NOTE — PLAN OF CARE
POC reviewed and is ongoing. Pt denies A/VH and S/HI. Pt interacts well with peers and staff. Isolates himself in the dining room area and watches television. Pt is medication compliant, cooperative and calm. Will continue to monitor to ensure pt's health and safety.

## 2023-11-04 NOTE — PLAN OF CARE
Patient calm and cooperative.  Compliant with medication.  Slept through the night with no concerns.  No unwanted behaviors on shift.

## 2023-11-04 NOTE — PROGRESS NOTES
PSYCHIATRY DAILY INPATIENT PROGRESS NOTE  SUBSEQUENT HOSPITAL VISIT    ENCOUNTER DATE: 11/4/2023  SITE: [facility]    DATE OF ADMISSION: 10/31/2023  8:53 AM  LENGTH OF STAY: 4 days    CHIEF COMPLAINT   Emre Enriquez is a 38 y.o. male, seen during daily abel rounds on the inpatient unit.  Emre Enriquez presented with the chief complaint of psychosis    The patient was seen and examined. The chart was reviewed.     Reviewed notes from Rns, MD, NP, and LPN and labs from the last 24 hours.    The patient's case was discussed with the treatment team/care providers today including Rns    Staff reports no behavioral or management issues.     The patient has been compliant with treatment.    Subjective 11/04/2023    Patient assessed today with assistance from hospital  line.    Per yesterday's notes: Patient continues to endorse auditory hallucinations.  He states that they are louder today compared to yesterday and are telling me to turn myself an, but I do not know who to turn myself in to.  Reports last incidence of hallucination was last night.  Denies active hallucinations at this time, however he states they come and go.  Patient reports mood is good, affect is appropriate, calm, pleasant.  No acute distress is observed.  Continues to complain of daily headaches which are relieved by Tylenol.  Patient endorses good sleep last night.  He is amenable to further titration of Zyprexa and states he feels he needs to be in the hospital in till the voices go away.    Today, he reports that the AH persists but are making slow progress. Sleep and mood are slowly improving. He is able to discuss his needs with staff in English, but he prefers translation services with his providers.   -he feels that he is making slow but steady progress.     Psychiatric ROS (observed, reported, or endorsed/denied):  Depressed mood - No  Interest/pleasure/anhedonia: No  Guilt/hopelessness/worthlessness -  No  Changes in Sleep - No  Changes in Appetite - No  Changes in Concentration - No  Changes in Energy - No  PMA/R- No  Suicidal- active/passive ideations - No  Homicidal ideations: active/passive ideations - No    Hallucinations - endorses  Delusions - No  Disorganized behavior - No  Disorganized speech - No  Negative symptoms - No    Elevated mood - No  Decreased need for sleep - No  Grandiosity - No  Racing thoughts - No  Impulsivity - No  Irritability- No  Increased energy - No  Distractibility - No  Increase in goal-directed activity or PMA- No    Symptoms of POLLY - No  Symptoms of Panic Disorder- No  Symptoms of PTSD - No    Overall progress: Patient is showing mild improvement     Psychotherapy:  Target symptoms: substance abuse, psychosis  Why chosen therapy is appropriate versus another modality: relevant to diagnosis, evidence based practice  Outcome monitoring methods: self-report, observation  Therapeutic intervention type: insight oriented psychotherapy, supportive psychotherapy  Topics discussed/themes: building skills sets for symptom management, symptom recognition, substance abuse  The patient's response to the intervention is accepting. The patient's progress toward treatment goals is fair.   Duration of intervention: 5 minutes.    Medical ROS  Review of Systems   Constitutional: Negative.    HENT: Negative.     Eyes: Negative.    Respiratory: Negative.     Cardiovascular: Negative.    Gastrointestinal: Negative.    Genitourinary: Negative.    Musculoskeletal: Negative.    Skin:  Positive for itching.   Neurological: Negative.    Endo/Heme/Allergies: Negative.    Psychiatric/Behavioral:  Positive for hallucinations and substance abuse.        PAST MEDICAL HISTORY   Past Medical History:   Diagnosis Date    History of psychiatric treatment        PSYCHOTROPIC MEDICATIONS   Scheduled Meds:   FLUoxetine  10 mg Oral Daily    hydrocortisone-aloe vera   Topical (Top) TID    OLANZapine  12.5 mg Oral Daily      Continuous Infusions:  PRN Meds:.acetaminophen, aluminum-magnesium hydroxide-simethicone, benzonatate, benztropine mesylate, hydrOXYzine pamoate, loperamide, nicotine, OLANZapine **AND** OLANZapine, ondansetron, promethazine    EXAMINATION    VITALS   Vitals:    11/02/23 1913 11/03/23 0749 11/03/23 1907 11/04/23 0712   BP: 109/72 121/77 133/67 121/78   BP Location: Left arm Left arm Left arm Left arm   Patient Position: Sitting Sitting Sitting Sitting   Pulse: 71 65 72 68   Resp: 16 18 18 18   Temp: 98.7 °F (37.1 °C) 98.8 °F (37.1 °C) 98.6 °F (37 °C) 98.2 °F (36.8 °C)   TempSrc: Oral Oral Oral Oral   SpO2: 98% 100% 99% 96%   Weight:       Height:           Body mass index is 22.45 kg/m².    CONSTITUTIONAL  General Appearance: unremarkable, age appropriate, normal weight    MUSCULOSKELETAL  Muscle Strength and Tone:no tremor, no tic  Abnormal Involuntary Movements: No  Gait and Station: non-ataxic    PSYCHIATRIC   Level of Consciousness: awake, alert , and oriented  Orientation: person, place, time, and situation  Grooming: Casually dressed and Well groomed  Psychomotor Behavior: normal, cooperative  Speech: normal tone, normal rate, normal pitch, normal volume  Language: grossly intact  Mood: fine  Affect: Blunted  Thought Process: linear, logical  Associations: intact   Thought Content: DENIES suicidal ideation, DENIES homicidal ideation, and no delusions  Perceptions: hallucinations:  auditory  Memory: Able to recall past events, Remote intact, and Recent intact  Attention:Attends to interview without distraction  Fund of Knowledge: Aware of current events and Vocabulary appropriate   Estimate if Intelligence:  Average based on work/education history, vocabulary and mental status exam  Insight: has awareness of illness  Judgment: behavior is adequate to circumstances    DIAGNOSTIC TESTING   Laboratory Results  No results found for this or any previous visit (from the past 24 hour(s)).      MEDICAL DECISION  MAKING   ASSESSMENT      Schizophrenia, chronic with acute exacerbation, severe (r/o SIPD)  Unspecified mood disorder  Unspecified Anxiety Disorder     Cocaine Use disorder  Nicotine Dependence      Psychosocial stressors        PROBLEM LIST AND MANAGEMENT PLANS     Psychosis: pt counseled  -Zyprexa increased to 10 mg qday -Increase to 12.5 mg today  -increased to 15 mg per day to tonight  -Likely substance-induced     Mood: pt counseled   -continue trial of prozac 10 mg po q day- increase to 20 mg po q day tomorrow     Anxiety: pt counseled  -meds as above  -start vistaril 50 mg po q 6 hours prn     Nicotine Dependence: pt counseled  -start nicotine 14 mg patch dermal daily     Cocaine use: pt counseled  -seek rehab if willing     Psychosocial stressors: pt counseled  -SW consulted to assist wit resources     Discussed diagnosis, risks and benefits of proposed treatment vs alternative treatments vs no treatment, potential side effects of these treatments and the inherent unpredictability of treatment. The patient expresses understanding of the above and displays the capacity to agree with this treatment given said understanding. Patient also agrees that, currently, the benefits outweigh the risks and would like to pursue/continue treatment at this time.    DISCHARGE PLANNING  Expected Disposition Plan: Home or Self Care    NEED FOR CONTINUED HOSPITALIZATION  Psychiatric illness continues to pose a potential threat to life or bodily function, of self or others, thereby requiring the need for continued inpatient psychiatric hospitalization: Yes, due to: significant psychotic thought disorder, as evidenced by:  Ongoing concerns with grave disability with patient unable to perform basic feeding, hygiene and dressing activities without significant constant support.    Protective inpatient pyschiatric hospitalization required while a safe disposition plan is enacted: Yes    Patient stabilized and ready for discharge from  inpatient psychiatric unit: No      STAFF:   Jay Rock MD  Psychiatry

## 2023-11-05 PROCEDURE — 25000003 PHARM REV CODE 250: Performed by: INTERNAL MEDICINE

## 2023-11-05 PROCEDURE — 25000003 PHARM REV CODE 250: Performed by: PSYCHIATRY & NEUROLOGY

## 2023-11-05 PROCEDURE — 90833 PSYTX W PT W E/M 30 MIN: CPT | Mod: ,,, | Performed by: PSYCHIATRY & NEUROLOGY

## 2023-11-05 PROCEDURE — 99233 SBSQ HOSP IP/OBS HIGH 50: CPT | Mod: ,,, | Performed by: PSYCHIATRY & NEUROLOGY

## 2023-11-05 PROCEDURE — 90833 PR PSYCHOTHERAPY W/PATIENT W/E&M, 30 MIN (ADD ON): ICD-10-PCS | Mod: ,,, | Performed by: PSYCHIATRY & NEUROLOGY

## 2023-11-05 PROCEDURE — 11400000 HC PSYCH PRIVATE ROOM

## 2023-11-05 PROCEDURE — 99233 PR SUBSEQUENT HOSPITAL CARE,LEVL III: ICD-10-PCS | Mod: ,,, | Performed by: PSYCHIATRY & NEUROLOGY

## 2023-11-05 PROCEDURE — S4991 NICOTINE PATCH NONLEGEND: HCPCS | Performed by: INTERNAL MEDICINE

## 2023-11-05 RX ADMIN — ACETAMINOPHEN 650 MG: 325 TABLET ORAL at 09:11

## 2023-11-05 RX ADMIN — NICOTINE 1 PATCH: 14 PATCH, EXTENDED RELEASE TRANSDERMAL at 09:11

## 2023-11-05 RX ADMIN — HYDROCORTISONE: 0.01 CREAM TOPICAL at 09:11

## 2023-11-05 RX ADMIN — HYDROCORTISONE 1 TUBE: 0.01 CREAM TOPICAL at 08:11

## 2023-11-05 RX ADMIN — ACETAMINOPHEN 650 MG: 325 TABLET ORAL at 04:11

## 2023-11-05 RX ADMIN — HYDROCORTISONE: 0.01 CREAM TOPICAL at 03:11

## 2023-11-05 RX ADMIN — FLUOXETINE 20 MG: 20 CAPSULE ORAL at 09:11

## 2023-11-05 RX ADMIN — OLANZAPINE 17.5 MG: 5 TABLET, FILM COATED ORAL at 08:11

## 2023-11-05 NOTE — PLAN OF CARE
Problem: Adult Behavioral Health Plan of Care  Goal: Plan of Care Review  Outcome: Ongoing, Progressing  Goal: Patient-Specific Goal (Individualization)  Outcome: Ongoing, Progressing  Goal: Adheres to Safety Considerations for Self and Others  Outcome: Ongoing, Progressing  Goal: Absence of New-Onset Illness or Injury  Outcome: Ongoing, Progressing  Goal: Optimized Coping Skills in Response to Life Stressors  Outcome: Ongoing, Progressing  Goal: Develops/Participates in Therapeutic [place] to Support Successful Transition  Outcome: Ongoing, Progressing  Goal: Rounds/Family Conference  Outcome: Ongoing, Progressing     Problem: Behavior Regulation Impairment (Psychotic Signs/Symptoms)  Goal: Improved Behavioral Control (Psychotic Signs/Symptoms)  Outcome: Ongoing, Progressing     Problem: Cognitive Impairment (Psychotic Signs/Symptoms)  Goal: Optimal Cognitive Function (Psychotic Signs/Symptoms)  Outcome: Ongoing, Progressing     Problem: Decreased Participation and Engagement (Psychotic Signs/Symptoms)  Goal: Increased Participation and Engagement (Psychotic Signs/Symptoms)  Outcome: Ongoing, Progressing     Problem: Mood Impairment (Psychotic Signs/Symptoms)  Goal: Improved Mood Symptoms (Psychotic Signs/Symptoms)  Outcome: Ongoing, Progressing     Problem: Psychomotor Impairment (Psychotic Signs/Symptoms)  Goal: Improved Psychomotor Symptoms (Psychotic Signs/Symptoms)  Outcome: Ongoing, Progressing     Problem: Sensory Perception Impairment (Psychotic Signs/Symptoms)  Goal: Decreased Sensory Symptoms (Psychotic Signs/Symptoms)  Outcome: Ongoing, Progressing     Problem: Sleep Disturbance (Psychotic Signs/Symptoms)  Goal: Improved Sleep (Psychotic Signs/Symptoms)  Outcome: Ongoing, Progressing     Problem: Social, Occupational or Functional Impairment (Psychotic Signs/Symptoms)  Goal: Enhanced Social, Occupational or Functional Skills (Psychotic Signs/Symptoms)  Outcome: Ongoing, Progressing     Problem:  Activity and Energy Impairment (Anxiety Signs/Symptoms)  Goal: Optimized Energy Level (Anxiety Signs/Symptoms)  Outcome: Ongoing, Progressing     Problem: Cognitive Impairment (Anxiety Signs/Symptoms)  Goal: Optimized Cognitive Function (Anxiety Signs/Symptoms)  Outcome: Ongoing, Progressing     Problem: Mood Impairment (Anxiety Signs/Symptoms)  Goal: Improved Mood Symptoms (Anxiety Signs/Symptoms)  Outcome: Ongoing, Progressing     Problem: Sleep Impairment (Anxiety Signs/Symptoms)  Goal: Improved Sleep (Anxiety Signs/Symptoms)  Outcome: Ongoing, Progressing     Problem: Social, Occupational or Functional Impairment (Anxiety Signs/Symptoms)  Goal: Enhanced Social, Occupational or Functional Skills (Anxiety Signs/Symptoms)  Outcome: Ongoing, Progressing     Problem: Somatic Disturbance (Anxiety Signs/Symptoms)  Goal: Improved Somatic Symptoms (Anxiety Signs/Symptoms)  Outcome: Ongoing, Progressing     Problem: Activity and Energy Impairment (Excessive Substance Use)  Goal: Optimized Energy Level (Excessive Substance Use)  Outcome: Ongoing, Progressing     Problem: Behavior Regulation Impairment (Excessive Substance Use)  Goal: Improved Behavioral Control (Excessive Substance Use)  Outcome: Ongoing, Progressing     Problem: Decreased Participation and Engagement (Excessive Substance Use)  Goal: Increased Participation and Engagement (Excessive Substance Use)  Outcome: Ongoing, Progressing     Problem: Physiologic Impairment (Excessive Substance Use)  Goal: Improved Physiologic Symptoms (Excessive Substance Use)  Outcome: Ongoing, Progressing     Problem: Social, Occupational or Functional Impairment (Excessive Substance Use)  Goal: Enhanced Social, Occupational or Functional Skills (Excessive Substance Use)  Outcome: Ongoing, Progressing     Problem: Activity and Energy Impairment (Depressive Signs/Symptoms)  Goal: Optimized Energy Level (Depressive Signs/Symptoms)  Outcome: Ongoing, Progressing     Problem:  Cognitive Impairment (Depressive Signs/Symptoms)  Goal: Optimized Cognitive Function  Outcome: Ongoing, Progressing     Problem: Decreased Participation/Engagement (Depressive Signs/Symptoms)  Goal: Increased Participation and Engagement (Depressive Signs/Symptoms)  Outcome: Ongoing, Progressing     Problem: Feelings of Worthlessness, Hopelessness or Excessive Guilt (Depressive Signs/Symptoms)  Goal: Enhanced Self-Esteem and Confidence (Depressive Signs/Symptoms)  Outcome: Ongoing, Progressing     Problem: Mood Impairment (Depressive Signs/Symptoms)  Goal: Improved Mood Symptoms (Depressive Signs/Symptoms)  Outcome: Ongoing, Progressing     Problem: Nutrition Imbalance (Depressive Signs/Symptoms)  Goal: Optimized Nutrition Intake  Outcome: Ongoing, Progressing     Problem: Psychomotor Impairment (Depressive Signs/Symptoms)  Goal: Improved Psychomotor Symptoms (Depressive Signs/Symptoms)  Outcome: Ongoing, Progressing     Problem: Sleep Disturbance (Depressive Signs/Symptoms)  Goal: Improved Sleep (Depressive Signs/Symptoms)  Outcome: Ongoing, Progressing     Problem: Social, Occupational or Functional Impairment (Depressive Signs/Symptoms)  Goal: Enhanced Social, Occupational or Functional Skills (Depressive Signs/Symptoms)  Outcome: Ongoing, Progressing

## 2023-11-05 NOTE — PROGRESS NOTES
PSYCHIATRY DAILY INPATIENT PROGRESS NOTE  SUBSEQUENT HOSPITAL VISIT    ENCOUNTER DATE: 11/5/2023  SITE: [facility]    DATE OF ADMISSION: 10/31/2023  8:53 AM  LENGTH OF STAY: 5 days    CHIEF COMPLAINT   Emre Enriquez is a 38 y.o. male, seen during daily abel rounds on the inpatient unit.  Emre Enriquez presented with the chief complaint of psychosis    The patient was seen and examined. The chart was reviewed.     Reviewed notes from Rns and LPN and labs from the last 24 hours.    The patient's case was discussed with the treatment team/care providers today including Rns    Staff reports no behavioral or management issues.     The patient has been compliant with treatment.    Subjective 11/05/2023    Patient assessed today with assistance from translation services.    Today, he reports that the AH persists but are still making slow progress. Sleep and mood are slowly improving. He is able to discuss his needs with staff in English, but he prefers translation services with his providers.   -he feels that he is making slow but steady progress. Depression/anxiety are slowly improving    Staff noted no psychotic behaviors.        Psychiatric ROS (observed, reported, or endorsed/denied):  Depressed mood - No  Interest/pleasure/anhedonia: No  Guilt/hopelessness/worthlessness - No  Changes in Sleep - No  Changes in Appetite - No  Changes in Concentration - No  Changes in Energy - No  PMA/R- No  Suicidal- active/passive ideations - No  Homicidal ideations: active/passive ideations - No    Hallucinations - endorses  Delusions - No  Disorganized behavior - No  Disorganized speech - No  Negative symptoms - No    Elevated mood - No  Decreased need for sleep - No  Grandiosity - No  Racing thoughts - No  Impulsivity - No  Irritability- No  Increased energy - No  Distractibility - No  Increase in goal-directed activity or PMA- No    Symptoms of POLLY - No  Symptoms of Panic Disorder- No  Symptoms of PTSD -  No    Overall progress: Patient is showing mild improvement     Psychotherapy:  Target symptoms: substance abuse, psychosis  Why chosen therapy is appropriate versus another modality: relevant to diagnosis, evidence based practice  Outcome monitoring methods: self-report, observation  Therapeutic intervention type: insight oriented psychotherapy, supportive psychotherapy  Topics discussed/themes: building skills sets for symptom management, symptom recognition, substance abuse  The patient's response to the intervention is accepting. The patient's progress toward treatment goals is fair.   Duration of intervention: 16 minutes.    Medical ROS  Review of Systems   Constitutional: Negative.    HENT: Negative.     Eyes: Negative.    Respiratory: Negative.     Cardiovascular: Negative.    Gastrointestinal: Negative.    Genitourinary: Negative.    Musculoskeletal: Negative.    Skin:  Positive for itching.   Neurological: Negative.    Endo/Heme/Allergies: Negative.    Psychiatric/Behavioral:  Positive for hallucinations and substance abuse.        PAST MEDICAL HISTORY   Past Medical History:   Diagnosis Date    History of psychiatric treatment        PSYCHOTROPIC MEDICATIONS   Scheduled Meds:   FLUoxetine  20 mg Oral Daily    hydrocortisone-aloe vera   Topical (Top) TID    OLANZapine  15 mg Oral Daily     Continuous Infusions:  PRN Meds:.acetaminophen, aluminum-magnesium hydroxide-simethicone, benzonatate, benztropine mesylate, hydrOXYzine pamoate, loperamide, nicotine, OLANZapine **AND** OLANZapine, ondansetron, promethazine    EXAMINATION    VITALS   Vitals:    11/03/23 1907 11/04/23 0712 11/04/23 1913 11/05/23 0727   BP: 133/67 121/78 122/73 114/70   BP Location: Left arm Left arm Left arm Left arm   Patient Position: Sitting Sitting Sitting Sitting   Pulse: 72 68 77 76   Resp: 18 18 20 18   Temp: 98.6 °F (37 °C) 98.2 °F (36.8 °C) 98.3 °F (36.8 °C) 98.8 °F (37.1 °C)   TempSrc: Oral Oral Oral Oral   SpO2: 99% 96% 97% 96%    Weight:       Height:           Body mass index is 22.45 kg/m².    CONSTITUTIONAL  General Appearance: unremarkable, age appropriate, normal weight    MUSCULOSKELETAL  Muscle Strength and Tone:no tremor, no tic  Abnormal Involuntary Movements: No  Gait and Station: non-ataxic    PSYCHIATRIC   Level of Consciousness: awake, alert , and oriented  Orientation: person, place, time, and situation  Grooming: Casually dressed and Well groomed  Psychomotor Behavior: normal, cooperative  Speech: normal tone, normal rate, normal pitch, normal volume  Language: grossly intact  Mood: fine  Affect: Blunted  Thought Process: linear, logical  Associations: intact   Thought Content: DENIES suicidal ideation, DENIES homicidal ideation, and no delusions  Perceptions: hallucinations:  auditory  Memory: Able to recall past events, Remote intact, and Recent intact  Attention:Attends to interview without distraction  Fund of Knowledge: Aware of current events and Vocabulary appropriate   Estimate if Intelligence:  Average based on work/education history, vocabulary and mental status exam  Insight: has awareness of illness  Judgment: behavior is adequate to circumstances    DIAGNOSTIC TESTING   Laboratory Results  No results found for this or any previous visit (from the past 24 hour(s)).      MEDICAL DECISION MAKING   ASSESSMENT      Schizophrenia, chronic with acute exacerbation, severe (r/o SIPD)  Unspecified mood disorder  Unspecified Anxiety Disorder     Cocaine Use disorder  Nicotine Dependence      Psychosocial stressors        PROBLEM LIST AND MANAGEMENT PLANS     Psychosis: pt counseled  -Zyprexa increased to 10 mg qday -Increase to 12.5 mg today  -increased to 15 mg per day- increase to 17.5 mg po q HS tonight  -Likely substance-induced     Mood: pt counseled   -continue trial of prozac 10 mg po q day- increase to 20 mg po q day today     Anxiety: pt counseled  -meds as above  -start vistaril 50 mg po q 6 hours prn      Nicotine Dependence: pt counseled  -start nicotine 14 mg patch dermal daily     Cocaine use: pt counseled  -seek rehab if willing     Psychosocial stressors: pt counseled  -SW consulted to assist wit resources     Discussed diagnosis, risks and benefits of proposed treatment vs alternative treatments vs no treatment, potential side effects of these treatments and the inherent unpredictability of treatment. The patient expresses understanding of the above and displays the capacity to agree with this treatment given said understanding. Patient also agrees that, currently, the benefits outweigh the risks and would like to pursue/continue treatment at this time.    DISCHARGE PLANNING  Expected Disposition Plan: Home or Self Care    NEED FOR CONTINUED HOSPITALIZATION  Psychiatric illness continues to pose a potential threat to life or bodily function, of self or others, thereby requiring the need for continued inpatient psychiatric hospitalization: Yes, due to: significant psychotic thought disorder, as evidenced by:  Ongoing concerns with grave disability with patient unable to perform basic feeding, hygiene and dressing activities without significant constant support.    Protective inpatient pyschiatric hospitalization required while a safe disposition plan is enacted: Yes    Patient stabilized and ready for discharge from inpatient psychiatric unit: No      STAFF:   Jay Rock MD  Psychiatry

## 2023-11-05 NOTE — NURSING
POC reviewed this shift and is ongoing.  Pt calm and cooperative on unit and compliant with medications.  Pt denies SI/HI, reports improvement in AH.  Pt out of room most of day, interacts well with staff and peers.

## 2023-11-06 PROCEDURE — 99232 SBSQ HOSP IP/OBS MODERATE 35: CPT | Mod: ,,, | Performed by: PSYCHIATRY & NEUROLOGY

## 2023-11-06 PROCEDURE — 99232 PR SUBSEQUENT HOSPITAL CARE,LEVL II: ICD-10-PCS | Mod: ,,, | Performed by: PSYCHIATRY & NEUROLOGY

## 2023-11-06 PROCEDURE — 90833 PR PSYCHOTHERAPY W/PATIENT W/E&M, 30 MIN (ADD ON): ICD-10-PCS | Mod: ,,, | Performed by: PSYCHIATRY & NEUROLOGY

## 2023-11-06 PROCEDURE — 11400000 HC PSYCH PRIVATE ROOM

## 2023-11-06 PROCEDURE — 25000003 PHARM REV CODE 250: Performed by: PSYCHIATRY & NEUROLOGY

## 2023-11-06 PROCEDURE — 25000003 PHARM REV CODE 250: Performed by: INTERNAL MEDICINE

## 2023-11-06 PROCEDURE — S4991 NICOTINE PATCH NONLEGEND: HCPCS | Performed by: INTERNAL MEDICINE

## 2023-11-06 PROCEDURE — 90833 PSYTX W PT W E/M 30 MIN: CPT | Mod: ,,, | Performed by: PSYCHIATRY & NEUROLOGY

## 2023-11-06 RX ADMIN — ACETAMINOPHEN 650 MG: 325 TABLET ORAL at 08:11

## 2023-11-06 RX ADMIN — FLUOXETINE 20 MG: 20 CAPSULE ORAL at 08:11

## 2023-11-06 RX ADMIN — HYDROXYZINE PAMOATE 50 MG: 50 CAPSULE ORAL at 08:11

## 2023-11-06 RX ADMIN — OLANZAPINE 17.5 MG: 5 TABLET, FILM COATED ORAL at 08:11

## 2023-11-06 RX ADMIN — NICOTINE 1 PATCH: 14 PATCH, EXTENDED RELEASE TRANSDERMAL at 08:11

## 2023-11-06 RX ADMIN — HYDROCORTISONE: 0.01 CREAM TOPICAL at 02:11

## 2023-11-06 RX ADMIN — ACETAMINOPHEN 650 MG: 325 TABLET ORAL at 03:11

## 2023-11-06 RX ADMIN — HYDROCORTISONE: 0.01 CREAM TOPICAL at 08:11

## 2023-11-06 NOTE — PLAN OF CARE
POC reviewed this shift and is ongoing.  Pt cooperative with staff interacts well with peers. Pt in activity room watching tv. Pt med compliant with no adverse reaction. Will continue to monitor for changes and safety.

## 2023-11-06 NOTE — NURSING
POC reviewed this shift and is ongoing.  Pt calm and cooperative on unit and compliant with medications.  Pt denies SI/HI endorses AH.  Pt spent most of the day out of his room and interacts well with staff and peers.  Pt given PRN tylenol for headache, treatment effective.

## 2023-11-06 NOTE — PLAN OF CARE
Problem: Adult Behavioral Health Plan of Care  Goal: Plan of Care Review  Outcome: Ongoing, Progressing  Goal: Patient-Specific Goal (Individualization)  Outcome: Ongoing, Progressing  Goal: Adheres to Safety Considerations for Self and Others  Outcome: Ongoing, Progressing  Goal: Absence of New-Onset Illness or Injury  Outcome: Ongoing, Progressing  Goal: Optimized Coping Skills in Response to Life Stressors  Outcome: Ongoing, Progressing  Goal: Develops/Participates in Therapeutic [place] to Support Successful Transition  Outcome: Ongoing, Progressing  Goal: Rounds/Family Conference  Outcome: Ongoing, Progressing     Problem: Behavior Regulation Impairment (Psychotic Signs/Symptoms)  Goal: Improved Behavioral Control (Psychotic Signs/Symptoms)  Outcome: Ongoing, Progressing     Problem: Cognitive Impairment (Psychotic Signs/Symptoms)  Goal: Optimal Cognitive Function (Psychotic Signs/Symptoms)  Outcome: Ongoing, Progressing     Problem: Decreased Participation and Engagement (Psychotic Signs/Symptoms)  Goal: Increased Participation and Engagement (Psychotic Signs/Symptoms)  Outcome: Ongoing, Progressing     Problem: Mood Impairment (Psychotic Signs/Symptoms)  Goal: Improved Mood Symptoms (Psychotic Signs/Symptoms)  Outcome: Ongoing, Progressing     Problem: Psychomotor Impairment (Psychotic Signs/Symptoms)  Goal: Improved Psychomotor Symptoms (Psychotic Signs/Symptoms)  Outcome: Ongoing, Progressing     Problem: Sensory Perception Impairment (Psychotic Signs/Symptoms)  Goal: Decreased Sensory Symptoms (Psychotic Signs/Symptoms)  Outcome: Ongoing, Progressing     Problem: Sleep Disturbance (Psychotic Signs/Symptoms)  Goal: Improved Sleep (Psychotic Signs/Symptoms)  Outcome: Ongoing, Progressing     Problem: Social, Occupational or Functional Impairment (Psychotic Signs/Symptoms)  Goal: Enhanced Social, Occupational or Functional Skills (Psychotic Signs/Symptoms)  Outcome: Ongoing, Progressing     Problem:  Activity and Energy Impairment (Anxiety Signs/Symptoms)  Goal: Optimized Energy Level (Anxiety Signs/Symptoms)  Outcome: Ongoing, Progressing     Problem: Cognitive Impairment (Anxiety Signs/Symptoms)  Goal: Optimized Cognitive Function (Anxiety Signs/Symptoms)  Outcome: Ongoing, Progressing     Problem: Mood Impairment (Anxiety Signs/Symptoms)  Goal: Improved Mood Symptoms (Anxiety Signs/Symptoms)  Outcome: Ongoing, Progressing     Problem: Sleep Impairment (Anxiety Signs/Symptoms)  Goal: Improved Sleep (Anxiety Signs/Symptoms)  Outcome: Ongoing, Progressing     Problem: Social, Occupational or Functional Impairment (Anxiety Signs/Symptoms)  Goal: Enhanced Social, Occupational or Functional Skills (Anxiety Signs/Symptoms)  Outcome: Ongoing, Progressing     Problem: Somatic Disturbance (Anxiety Signs/Symptoms)  Goal: Improved Somatic Symptoms (Anxiety Signs/Symptoms)  Outcome: Ongoing, Progressing     Problem: Activity and Energy Impairment (Excessive Substance Use)  Goal: Optimized Energy Level (Excessive Substance Use)  Outcome: Ongoing, Progressing     Problem: Behavior Regulation Impairment (Excessive Substance Use)  Goal: Improved Behavioral Control (Excessive Substance Use)  Outcome: Ongoing, Progressing     Problem: Decreased Participation and Engagement (Excessive Substance Use)  Goal: Increased Participation and Engagement (Excessive Substance Use)  Outcome: Ongoing, Progressing     Problem: Physiologic Impairment (Excessive Substance Use)  Goal: Improved Physiologic Symptoms (Excessive Substance Use)  Outcome: Ongoing, Progressing     Problem: Social, Occupational or Functional Impairment (Excessive Substance Use)  Goal: Enhanced Social, Occupational or Functional Skills (Excessive Substance Use)  Outcome: Ongoing, Progressing     Problem: Activity and Energy Impairment (Depressive Signs/Symptoms)  Goal: Optimized Energy Level (Depressive Signs/Symptoms)  Outcome: Ongoing, Progressing     Problem:  Cognitive Impairment (Depressive Signs/Symptoms)  Goal: Optimized Cognitive Function  Outcome: Ongoing, Progressing     Problem: Decreased Participation/Engagement (Depressive Signs/Symptoms)  Goal: Increased Participation and Engagement (Depressive Signs/Symptoms)  Outcome: Ongoing, Progressing     Problem: Feelings of Worthlessness, Hopelessness or Excessive Guilt (Depressive Signs/Symptoms)  Goal: Enhanced Self-Esteem and Confidence (Depressive Signs/Symptoms)  Outcome: Ongoing, Progressing     Problem: Mood Impairment (Depressive Signs/Symptoms)  Goal: Improved Mood Symptoms (Depressive Signs/Symptoms)  Outcome: Ongoing, Progressing     Problem: Nutrition Imbalance (Depressive Signs/Symptoms)  Goal: Optimized Nutrition Intake  Outcome: Ongoing, Progressing     Problem: Psychomotor Impairment (Depressive Signs/Symptoms)  Goal: Improved Psychomotor Symptoms (Depressive Signs/Symptoms)  Outcome: Ongoing, Progressing     Problem: Sleep Disturbance (Depressive Signs/Symptoms)  Goal: Improved Sleep (Depressive Signs/Symptoms)  Outcome: Ongoing, Progressing     Problem: Social, Occupational or Functional Impairment (Depressive Signs/Symptoms)  Goal: Enhanced Social, Occupational or Functional Skills (Depressive Signs/Symptoms)  Outcome: Ongoing, Progressing

## 2023-11-06 NOTE — PROGRESS NOTES
"PSYCHIATRY DAILY INPATIENT PROGRESS NOTE  SUBSEQUENT HOSPITAL VISIT    ENCOUNTER DATE: 11/6/2023  SITE: [facility]    DATE OF ADMISSION: 10/31/2023  8:53 AM  LENGTH OF STAY: 6 days    CHIEF COMPLAINT   Emre Enriquez is a 38 y.o. male, seen during daily abel rounds on the inpatient unit.  Emre Enriquez presented with the chief complaint of psychosis    The patient was seen and examined. The chart was reviewed.     Reviewed notes from Rns and LPN and labs from the last 24 hours.    The patient's case was discussed with the treatment team/care providers today including Rns    Staff reports no behavioral or management issues.     The patient has been compliant with treatment.    Subjective 11/06/2023    Patient assessed today with assistance from translation services.    Patient continues to endorse auditory hallucinations which he states are decreasing somewhat in volume but not frequency, states "They keep saying Emre! Emre! It makes me scared." Pt reports nightmares x2 nights accompanied by the sensation that "something's touching me." He endorses continued depression and anxiety which he states is due mostly to the continuing hallucinations.     Today patient states that as of the last year, he has been using cocaine "at least twice a day." He is potentially interested in rehab.     REports periodic episodes of "racing heartbeat" throughout the day, which he states is a new problem since start of hospitalization. Due to language barrier, he requests vistaril be administered on a scheduled basis versus PRN.      Psychiatric ROS (observed, reported, or endorsed/denied):  Depressed mood - Yes  Interest/pleasure/anhedonia: No  Guilt/hopelessness/worthlessness - No  Changes in Sleep - No  Changes in Appetite - No  Changes in Concentration - No  Changes in Energy - No  PMA/R- No  Suicidal- active/passive ideations - No  Homicidal ideations: active/passive ideations - No    Hallucinations - " endorses  Delusions - No  Disorganized behavior - No  Disorganized speech - No  Negative symptoms - No    Elevated mood - No  Decreased need for sleep - No  Grandiosity - No  Racing thoughts - No  Impulsivity - No  Irritability- No  Increased energy - No  Distractibility - No  Increase in goal-directed activity or PMA- No    Symptoms of POLLY - endorses  Symptoms of Panic Disorder- No  Symptoms of PTSD - No    Overall progress: Patient is showing mild improvement     Psychotherapy:  Target symptoms: substance abuse, psychosis  Why chosen therapy is appropriate versus another modality: relevant to diagnosis, evidence based practice  Outcome monitoring methods: self-report, observation  Therapeutic intervention type: insight oriented psychotherapy, supportive psychotherapy  Topics discussed/themes: building skills sets for symptom management, symptom recognition, substance abuse  The patient's response to the intervention is accepting. The patient's progress toward treatment goals is fair.   Duration of intervention: 16 minutes.    Medical ROS  Review of Systems   Constitutional: Negative.    HENT: Negative.     Eyes: Negative.    Respiratory: Negative.     Cardiovascular: Negative.    Gastrointestinal: Negative.    Genitourinary: Negative.    Musculoskeletal: Negative.    Skin:  Positive for itching.   Neurological: Negative.    Endo/Heme/Allergies: Negative.    Psychiatric/Behavioral:  Positive for hallucinations and substance abuse.        PAST MEDICAL HISTORY   Past Medical History:   Diagnosis Date    History of psychiatric treatment        PSYCHOTROPIC MEDICATIONS   Scheduled Meds:   FLUoxetine  20 mg Oral Daily    hydrocortisone-aloe vera   Topical (Top) TID    OLANZapine  17.5 mg Oral QHS     Continuous Infusions:  PRN Meds:.acetaminophen, aluminum-magnesium hydroxide-simethicone, benzonatate, benztropine mesylate, hydrOXYzine pamoate, loperamide, nicotine, OLANZapine **AND** OLANZapine, ondansetron,  promethazine    EXAMINATION    VITALS   Vitals:    11/04/23 1913 11/05/23 0727 11/05/23 1925 11/06/23 0735   BP: 122/73 114/70 117/65 111/74   BP Location: Left arm Left arm Left arm Left arm   Patient Position: Sitting Sitting Sitting Sitting   Pulse: 77 76 79 76   Resp: 20 18 18 20   Temp: 98.3 °F (36.8 °C) 98.8 °F (37.1 °C) 98.6 °F (37 °C) 97.8 °F (36.6 °C)   TempSrc: Oral Oral Oral Oral   SpO2: 97% 96% 96% 98%   Weight:       Height:           Body mass index is 22.45 kg/m².    CONSTITUTIONAL  General Appearance: unremarkable, age appropriate, normal weight    MUSCULOSKELETAL  Muscle Strength and Tone:no tremor, no tic  Abnormal Involuntary Movements: No  Gait and Station: non-ataxic    PSYCHIATRIC   Level of Consciousness: awake, alert , and oriented  Orientation: person, place, time, and situation  Grooming: Casually dressed and Well groomed  Psychomotor Behavior: normal, cooperative  Speech: normal tone, normal rate, normal pitch, normal volume  Language: grossly intact  Mood: Anxious, scared  Affect: Blunted  Thought Process: linear, logical  Associations: intact   Thought Content: DENIES suicidal ideation, DENIES homicidal ideation, and no delusions  Perceptions: hallucinations:  auditory  Memory: Able to recall past events, Remote intact, and Recent intact  Attention:Attends to interview without distraction  Fund of Knowledge: Aware of current events and Vocabulary appropriate   Estimate if Intelligence:  Average based on work/education history, vocabulary and mental status exam  Insight: has awareness of illness  Judgment: behavior is adequate to circumstances    DIAGNOSTIC TESTING   Laboratory Results  No results found for this or any previous visit (from the past 24 hour(s)).      MEDICAL DECISION MAKING   ASSESSMENT      Schizophrenia, chronic with acute exacerbation, severe (r/o SIPD)  Unspecified mood disorder  Unspecified Anxiety Disorder     Cocaine Use disorder  Nicotine Dependence       Psychosocial stressors        PROBLEM LIST AND MANAGEMENT PLANS     Psychosis: pt counseled  -Zyprexa increased to 10 mg qday -Increase to 12.5 mg today  -increased to 15 mg per day- increase to 17.5 mg po q HS tonight  -Likely substance-induced  -Consider CT     Mood: pt counseled   -continue trial of prozac 10 mg po q day- increase to 20 mg po q day today-Continue     Anxiety: pt counseled  -meds as above  -start vistaril 50 mg po q 6 hours prn-Switch to scheduled dosing as noted above  -Initiate melatonin qhs for insomnia     Nicotine Dependence: pt counseled  -start nicotine 14 mg patch dermal daily     Cocaine use: pt counseled  -seek rehab if willing     Psychosocial stressors: pt counseled  -SW consulted to assist wit resources     Discussed diagnosis, risks and benefits of proposed treatment vs alternative treatments vs no treatment, potential side effects of these treatments and the inherent unpredictability of treatment. The patient expresses understanding of the above and displays the capacity to agree with this treatment given said understanding. Patient also agrees that, currently, the benefits outweigh the risks and would like to pursue/continue treatment at this time.    DISCHARGE PLANNING  Expected Disposition Plan: Home or Self Care    NEED FOR CONTINUED HOSPITALIZATION  Psychiatric illness continues to pose a potential threat to life or bodily function, of self or others, thereby requiring the need for continued inpatient psychiatric hospitalization: Yes, due to: significant psychotic thought disorder, as evidenced by:  Ongoing concerns with grave disability with patient unable to perform basic feeding, hygiene and dressing activities without significant constant support.    Protective inpatient pyschiatric hospitalization required while a safe disposition plan is enacted: Yes    Patient stabilized and ready for discharge from inpatient psychiatric unit: No      STAFF:   Erick Valera  NP  Psychiatry

## 2023-11-07 PROCEDURE — S4991 NICOTINE PATCH NONLEGEND: HCPCS | Performed by: INTERNAL MEDICINE

## 2023-11-07 PROCEDURE — 25000003 PHARM REV CODE 250: Performed by: PSYCHIATRY & NEUROLOGY

## 2023-11-07 PROCEDURE — 25000003 PHARM REV CODE 250: Performed by: INTERNAL MEDICINE

## 2023-11-07 PROCEDURE — 11400000 HC PSYCH PRIVATE ROOM

## 2023-11-07 PROCEDURE — 90833 PSYTX W PT W E/M 30 MIN: CPT | Mod: ,,, | Performed by: PSYCHIATRY & NEUROLOGY

## 2023-11-07 PROCEDURE — 90833 PR PSYCHOTHERAPY W/PATIENT W/E&M, 30 MIN (ADD ON): ICD-10-PCS | Mod: ,,, | Performed by: PSYCHIATRY & NEUROLOGY

## 2023-11-07 PROCEDURE — 99232 SBSQ HOSP IP/OBS MODERATE 35: CPT | Mod: ,,, | Performed by: PSYCHIATRY & NEUROLOGY

## 2023-11-07 PROCEDURE — 99232 PR SUBSEQUENT HOSPITAL CARE,LEVL II: ICD-10-PCS | Mod: ,,, | Performed by: PSYCHIATRY & NEUROLOGY

## 2023-11-07 RX ORDER — HYDROXYZINE PAMOATE 50 MG/1
50 CAPSULE ORAL
Status: DISCONTINUED | OUTPATIENT
Start: 2023-11-07 | End: 2023-11-08 | Stop reason: HOSPADM

## 2023-11-07 RX ORDER — TALC
6 POWDER (GRAM) TOPICAL NIGHTLY PRN
Status: DISCONTINUED | OUTPATIENT
Start: 2023-11-07 | End: 2023-11-08 | Stop reason: HOSPADM

## 2023-11-07 RX ADMIN — ACETAMINOPHEN 650 MG: 325 TABLET ORAL at 08:11

## 2023-11-07 RX ADMIN — HYDROCORTISONE: 0.01 CREAM TOPICAL at 08:11

## 2023-11-07 RX ADMIN — FLUOXETINE 20 MG: 20 CAPSULE ORAL at 08:11

## 2023-11-07 RX ADMIN — HYDROCORTISONE: 0.01 CREAM TOPICAL at 02:11

## 2023-11-07 RX ADMIN — HYDROXYZINE PAMOATE 50 MG: 50 CAPSULE ORAL at 01:11

## 2023-11-07 RX ADMIN — HYDROXYZINE PAMOATE 50 MG: 50 CAPSULE ORAL at 08:11

## 2023-11-07 RX ADMIN — OLANZAPINE 15 MG: 5 TABLET, FILM COATED ORAL at 08:11

## 2023-11-07 RX ADMIN — NICOTINE 1 PATCH: 14 PATCH, EXTENDED RELEASE TRANSDERMAL at 08:11

## 2023-11-07 NOTE — PROGRESS NOTES
"PSYCHIATRY DAILY INPATIENT PROGRESS NOTE  SUBSEQUENT HOSPITAL VISIT    ENCOUNTER DATE: 11/7/2023  SITE: [facility]    DATE OF ADMISSION: 10/31/2023  8:53 AM  LENGTH OF STAY: 7 days    CHIEF COMPLAINT   Emre Enriquez is a 38 y.o. male, seen during daily abel rounds on the inpatient unit.  Emre Enriquez presented with the chief complaint of psychosis    The patient was seen and examined. The chart was reviewed.     Reviewed notes from Rns and LPN and labs from the last 24 hours.    The patient's case was discussed with the treatment team/care providers today including Rns    Staff reports no behavioral or management issues.     The patient has been compliant with treatment.    Subjective 11/07/2023    Patient assessed today with assistance from translation services.    Patient reports mood is "ok," affect is appropriate, congruent with mood. Continues to endorse auditory hallucinations, "mostly at night" which he states are steadily decreasing in volume/frequency. Reports sleep was "much better" last night. Pt is still considering inpatient vs outpatient rehab and states that he needs to speak to his friend/roommate to make arrangements before making a decision, which he states he will try to do today.     Patient continues to c/o rapid heart rate/palpitations occurring intermittently throughout the day with occasional mild chest pain. Also endorses episodic dizziness x2 days, and "seeing lights when I stand up from lying down." Staff to check orthostatic vital signs.      Psychiatric ROS (observed, reported, or endorsed/denied):  Depressed mood - less  Interest/pleasure/anhedonia: No  Guilt/hopelessness/worthlessness - No  Changes in Sleep - No  Changes in Appetite - No  Changes in Concentration - No  Changes in Energy - No  PMA/R- No  Suicidal- active/passive ideations - No  Homicidal ideations: active/passive ideations - No    Hallucinations - improving steadily  Delusions - No  Disorganized " behavior - No  Disorganized speech - No  Negative symptoms - No    Elevated mood - No  Decreased need for sleep - No  Grandiosity - No  Racing thoughts - No  Impulsivity - No  Irritability- No  Increased energy - No  Distractibility - No  Increase in goal-directed activity or PMA- No    Symptoms of POLLY - endorses  Symptoms of Panic Disorder- No  Symptoms of PTSD - No    Overall progress: Patient is showing moderate improvement    Psychotherapy:  Target symptoms: substance abuse, psychosis  Why chosen therapy is appropriate versus another modality: relevant to diagnosis, evidence based practice  Outcome monitoring methods: self-report, observation  Therapeutic intervention type: insight oriented psychotherapy, supportive psychotherapy  Topics discussed/themes: building skills sets for symptom management, symptom recognition, substance abuse  The patient's response to the intervention is accepting. The patient's progress toward treatment goals is fair.   Duration of intervention: 16 minutes.    Medical ROS  Review of Systems   Constitutional: Negative.    HENT: Negative.     Eyes: Negative.    Respiratory: Negative.     Cardiovascular:  Positive for chest pain and palpitations.   Gastrointestinal: Negative.    Genitourinary: Negative.    Musculoskeletal: Negative.    Skin:  Positive for itching.   Neurological: Negative.    Endo/Heme/Allergies: Negative.    Psychiatric/Behavioral:  Positive for hallucinations and substance abuse.        PAST MEDICAL HISTORY   Past Medical History:   Diagnosis Date    History of psychiatric treatment        PSYCHOTROPIC MEDICATIONS   Scheduled Meds:   FLUoxetine  20 mg Oral Daily    hydrocortisone-aloe vera   Topical (Top) TID    OLANZapine  17.5 mg Oral QHS     Continuous Infusions:  PRN Meds:.acetaminophen, aluminum-magnesium hydroxide-simethicone, benzonatate, benztropine mesylate, hydrOXYzine pamoate, loperamide, nicotine, OLANZapine **AND** OLANZapine, ondansetron,  promethazine    EXAMINATION    VITALS   Vitals:    11/05/23 1925 11/06/23 0735 11/06/23 1932 11/07/23 0733   BP: 117/65 111/74 125/71 117/77   BP Location: Left arm Left arm Left arm Left arm   Patient Position: Sitting Sitting Sitting Sitting   Pulse: 79 76 80 73   Resp: 18 20 20 20   Temp: 98.6 °F (37 °C) 97.8 °F (36.6 °C) 98.4 °F (36.9 °C) 98.9 °F (37.2 °C)   TempSrc: Oral Oral Oral Oral   SpO2: 96% 98% 96% 97%   Weight:       Height:           Body mass index is 22.45 kg/m².    CONSTITUTIONAL  General Appearance: unremarkable, age appropriate, normal weight    MUSCULOSKELETAL  Muscle Strength and Tone:no tremor, no tic  Abnormal Involuntary Movements: No  Gait and Station: non-ataxic    PSYCHIATRIC   Level of Consciousness: awake, alert , and oriented  Orientation: person, place, time, and situation  Grooming: Casually dressed and Well groomed  Psychomotor Behavior: normal, cooperative  Speech: normal tone, normal rate, normal pitch, normal volume  Language: grossly intact  Mood: Anxious, scared  Affect: Consistent with mood and Congruent with thought  Thought Process: linear, logical  Associations: intact   Thought Content: DENIES suicidal ideation, DENIES homicidal ideation, and no delusions  Perceptions: hallucinations:  auditory  Memory: Able to recall past events, Remote intact, and Recent intact  Attention:Attends to interview without distraction  Fund of Knowledge: Aware of current events and Vocabulary appropriate   Estimate if Intelligence:  Average based on work/education history, vocabulary and mental status exam  Insight: has awareness of illness  Judgment: behavior is adequate to circumstances    DIAGNOSTIC TESTING   Laboratory Results  No results found for this or any previous visit (from the past 24 hour(s)).      MEDICAL DECISION MAKING   ASSESSMENT      Schizophrenia, chronic with acute exacerbation, severe (r/o SIPD)  Unspecified mood disorder  Unspecified Anxiety Disorder     Cocaine Use  disorder  Nicotine Dependence      Psychosocial stressors        PROBLEM LIST AND MANAGEMENT PLANS     Psychosis: pt counseled  -Zyprexa increased to 10 mg qday -Increase to 12.5 mg today  -increased to 15 mg per day- increase to 17.5 mg po q HS tonight-Decrease to 15 mg qhs d/t hypotension  -Likely substance-induced       Mood: pt counseled   -continue trial of prozac 10 mg po q day- increase to 20 mg po q day today-Continue     Anxiety: pt counseled  -meds as above  -start vistaril 50 mg po q 6 hours prn-Switch to scheduled dosing as noted above  -Initiate melatonin qhs for insomnia     Nicotine Dependence: pt counseled  -start nicotine 14 mg patch dermal daily     Cocaine use: pt counseled  -seek rehab if willing     Psychosocial stressors: pt counseled  -SW consulted to assist wit resources     Discussed diagnosis, risks and benefits of proposed treatment vs alternative treatments vs no treatment, potential side effects of these treatments and the inherent unpredictability of treatment. The patient expresses understanding of the above and displays the capacity to agree with this treatment given said understanding. Patient also agrees that, currently, the benefits outweigh the risks and would like to pursue/continue treatment at this time.    DISCHARGE PLANNING  Expected Disposition Plan: Home or Self Care    NEED FOR CONTINUED HOSPITALIZATION  Psychiatric illness continues to pose a potential threat to life or bodily function, of self or others, thereby requiring the need for continued inpatient psychiatric hospitalization: Yes, due to: significant psychotic thought disorder, as evidenced by:  Ongoing concerns with grave disability with patient unable to perform basic feeding, hygiene and dressing activities without significant constant support.    Protective inpatient pyschiatric hospitalization required while a safe disposition plan is enacted: Yes    Patient stabilized and ready for discharge from inpatient  psychiatric unit: No      STAFF:   Erick Valera NP  Psychiatry

## 2023-11-07 NOTE — PLAN OF CARE
POC reviewed this shift and is ongoing.  Pt cooperative with staff and interacts well with peers. Pt watching tv and coloring this shift. Med compliant with no adverse reaction. Will continue to monitor for changes and safety.

## 2023-11-07 NOTE — PLAN OF CARE
POC reviewed this shift and is on going. Patient is calm, cooperative, and quiet. Denies Suicidal Ideation, Homicidal Ideation, Auditory Hallucinations, Visual Hallucinations, Tactile Hallucinations, Gustatory Hallucinations, and Delusions. Patient out on the floor all day in the dining room coloring with on of his peers. Patient is cooperative, no issue at this time. Pt continues to be medication compliant and staff will continue to monitor pt closely while on the unit.

## 2023-11-08 VITALS
DIASTOLIC BLOOD PRESSURE: 75 MMHG | OXYGEN SATURATION: 98 % | BODY MASS INDEX: 22.48 KG/M2 | TEMPERATURE: 99 F | HEIGHT: 65 IN | RESPIRATION RATE: 18 BRPM | SYSTOLIC BLOOD PRESSURE: 122 MMHG | HEART RATE: 75 BPM | WEIGHT: 134.94 LBS

## 2023-11-08 PROCEDURE — 99239 HOSP IP/OBS DSCHRG MGMT >30: CPT | Mod: ,,, | Performed by: STUDENT IN AN ORGANIZED HEALTH CARE EDUCATION/TRAINING PROGRAM

## 2023-11-08 PROCEDURE — 25000003 PHARM REV CODE 250: Performed by: INTERNAL MEDICINE

## 2023-11-08 PROCEDURE — S4991 NICOTINE PATCH NONLEGEND: HCPCS | Performed by: INTERNAL MEDICINE

## 2023-11-08 PROCEDURE — 25000003 PHARM REV CODE 250: Performed by: PSYCHIATRY & NEUROLOGY

## 2023-11-08 PROCEDURE — 99239 PR HOSPITAL DISCHARGE DAY,>30 MIN: ICD-10-PCS | Mod: ,,, | Performed by: STUDENT IN AN ORGANIZED HEALTH CARE EDUCATION/TRAINING PROGRAM

## 2023-11-08 PROCEDURE — 90833 PR PSYCHOTHERAPY W/PATIENT W/E&M, 30 MIN (ADD ON): ICD-10-PCS | Mod: ,,, | Performed by: STUDENT IN AN ORGANIZED HEALTH CARE EDUCATION/TRAINING PROGRAM

## 2023-11-08 PROCEDURE — 90833 PSYTX W PT W E/M 30 MIN: CPT | Mod: ,,, | Performed by: STUDENT IN AN ORGANIZED HEALTH CARE EDUCATION/TRAINING PROGRAM

## 2023-11-08 RX ORDER — FLUOXETINE HYDROCHLORIDE 20 MG/1
20 CAPSULE ORAL DAILY
Qty: 30 CAPSULE | Refills: 0 | Status: SHIPPED | OUTPATIENT
Start: 2023-11-09 | End: 2024-11-08

## 2023-11-08 RX ORDER — HYDROXYZINE PAMOATE 50 MG/1
50 CAPSULE ORAL EVERY 6 HOURS PRN
Qty: 90 CAPSULE | Refills: 0 | Status: SHIPPED | OUTPATIENT
Start: 2023-11-08

## 2023-11-08 RX ORDER — IBUPROFEN 200 MG
1 TABLET ORAL DAILY PRN
Qty: 28 PATCH | Refills: 0 | Status: SHIPPED | OUTPATIENT
Start: 2023-11-08

## 2023-11-08 RX ORDER — OLANZAPINE 15 MG/1
15 TABLET ORAL NIGHTLY
Qty: 30 TABLET | Refills: 0 | Status: SHIPPED | OUTPATIENT
Start: 2023-11-08 | End: 2024-11-07

## 2023-11-08 RX ADMIN — HYDROXYZINE PAMOATE 50 MG: 50 CAPSULE ORAL at 02:11

## 2023-11-08 RX ADMIN — FLUOXETINE 20 MG: 20 CAPSULE ORAL at 08:11

## 2023-11-08 RX ADMIN — ACETAMINOPHEN 650 MG: 325 TABLET ORAL at 02:11

## 2023-11-08 RX ADMIN — HYDROCORTISONE: 0.01 CREAM TOPICAL at 02:11

## 2023-11-08 RX ADMIN — NICOTINE 1 PATCH: 14 PATCH, EXTENDED RELEASE TRANSDERMAL at 08:11

## 2023-11-08 RX ADMIN — ACETAMINOPHEN 650 MG: 325 TABLET ORAL at 08:11

## 2023-11-08 RX ADMIN — HYDROXYZINE PAMOATE 50 MG: 50 CAPSULE ORAL at 08:11

## 2023-11-08 RX ADMIN — HYDROCORTISONE: 0.01 CREAM TOPICAL at 08:11

## 2023-11-08 NOTE — DISCHARGE SUMMARY
"Discharge Summary  Psychiatry    Admit Date: 10/31/2023    Discharge Date and Time:  11/08/2023 11:27 AM    Attending Physician: Jay Rock MD     Discharge Provider: Jose Fritz III    Reason for Admission:  CHIEF COMPLAINT   Emre Enriquez is a 38 y.o. male with a past psychiatric history of psychosis currently admitted to the inpatient unit with the following chief complaint: psychosis, "I hear voices."    HPI   The patient was seen and examined. The chart was reviewed.     The patient presented to the ER on 10/31/2023 . Per staff notes:  - Male presenting to the emergency department with a chief complaint of auditory hallucinations.  He has a history of problem and states that hospitalized in the past.  He has been compliant with his antipsychotic medications until 5-6 days ago.  Symptoms returned 3-4 days ago.  He states that he feels that everyone is looking at him.  Voices telling him to turn himself in before things get bad".  Currently denies any suicidal or homicidal ideation or plan.  -complaint of AH. Pt reports he recently stopped his psychiatric medications (unable to name them) 5 days ago and did cocaine over the weekend and became paranoid that people were "out to get him". Pt states he has been hearing voices telling him to "turn himself in". Pt denies SI/HI but reports he is fearful for his life due to AH and paranoia. Pt is AAOX4 and VSS  -X3-4 days of auditory hallucinations. Pt with psych hx and currently not taking his meds for 5-6 days ago. The voices he hears is telling him to "turn himself in and I'm scared for my life." Pt denies SI or HI.   -Emre Enriquez is a 38 y.o. male, with a past medical history of psychiatric illness, who presents to the ED with auditory hallucinations onset 3-4 days ago. Patient reports hearing voices telling him to "turn himself in" and "I'm scared for my life". He also notes believing people are out to get him. He notes not wanting " "to harm others, but is afraid of the extent of the voices. Patient reports consumption of EtOH and cocaine this weekend. Patient notes he stopped taking his psychiatric medication 5-6 days ago. He does not remember the name of the medication. No other exacerbating or alleviating factors. Patient denies suicidal ideation, fatigue, or other associated symptoms.      The patient was medically cleared and admitted to the U.     The interview was conducted with an interpretor.      The patient reports that he has a prior h/o AH/psychosis. He was treated then "they went away and then they came back. He was last treated about 2 months ago with an unknown medications.     Voices/AH have been increasingly worse and are telling him that the are screening his children.     He reports that he has been depressed and anxious.       He reports infrequent cocaine use.    Procedures Performed: * No surgery found *    Hospital Course:    Patient was admitted to the inpatient psychiatry unit after being medically cleared in the ED. Chart and labs were reviewed. The patient was stabilized as follows:      Psychosis: pt counseled  -Zyprexa increased to 10 mg qday -Increase to 12.5 mg today  -increased to 15 mg per day- increase to 17.5 mg po q HS tonight-Decrease to 15 mg qhs d/t hypotension  -Likely substance-induced        Mood: pt counseled   -continue trial of prozac 10 mg po q day- increase to 20 mg po q day today-Continue     Anxiety: pt counseled  -meds as above  -start vistaril 50 mg po q 6 hours prn-Switch to scheduled dosing as noted above  -Initiate melatonin qhs for insomnia     Nicotine Dependence: pt counseled  -start nicotine 14 mg patch dermal daily     Cocaine use: pt counseled  -seek rehab if willing     Psychosocial stressors: pt counseled  -SW consulted to assist wit resources         During hospitalization, the patient was encouraged to go to both groups and individual counseling. Patient was monitored for any " side effects. A meeting was held with multidisciplinary team prior to discharge and pt's diagnosis, current medications, and follow up were discussed. The patient has been compliant with treatment and can adequately attend to activities of daily living in an independent manner. The patient denies any side effects. The patient denies SI, HI, plan or intent for self harm or harm to others. The patient is no longer a danger to self or others nor gravely disabled disabled. Patient discharged  in stable condition with scheduled outpatient follow up.      Discussed diagnosis, risks and benefits of proposed treatment vs alternative treatments vs no treatment, and potential side effects of these treatments.  The patient expresses understanding of the above and displays the capacity to agree with this treatment given said understanding.  Patient also agrees that, currently, the benefits outweigh the risks and would like to pursue treatment at this time.      Discharge MSE: stated age, casually dressed, well groomed.  No psychomotor agitation or retardation.  No abnormal involuntary movements.  Gait normal.  Speech normal, conversational.  Language fluent English. Mood fine.  Affect normal range, pleasant, euthymic.  Thought process linear.  Associations intact.  Denies suicidal or homicidal ideation.  Denies auditory hallucinations, paranoid ideation, ideas of reference.  Memory intact.  Attention intact.  Fund of knowledge intact.  Insight intact.  Judgment intact.  Alert and oriented to person, place, time.      Tobacco Usage:  Is patient a smoker? Yes  Does patient want prescription for Tobacco Cessation? Yes  Does patient want counseling for Tobacco Cessation? Yes    If patient would like to quit, then over the counter nicotine patch could be used. The patient could also follow up with his PCP or psychiatric provider for other alternatives.     Final Diagnoses:    Principal Problem: Schizophrenia, chronic with acute  exacerbation, severe      Secondary Diagnoses:     Unspecified mood disorder  Unspecified Anxiety Disorder     Cocaine Use disorder  Nicotine Dependence      Psychosocial stressors    Labs:  Admission on 10/31/2023   Component Date Value Ref Range Status    Hemoglobin A1C 11/01/2023 5.4  4.0 - 5.6 % Final    Estimated Avg Glucose 11/01/2023 108  68 - 131 mg/dL Final    Cholesterol 11/01/2023 167  120 - 199 mg/dL Final    Triglycerides 11/01/2023 145  30 - 150 mg/dL Final    HDL 11/01/2023 45  40 - 75 mg/dL Final    LDL Cholesterol 11/01/2023 93.0  63.0 - 159.0 mg/dL Final    HDL/Cholesterol Ratio 11/01/2023 26.9  20.0 - 50.0 % Final    Total Cholesterol/HDL Ratio 11/01/2023 3.7  2.0 - 5.0 Final    Non-HDL Cholesterol 11/01/2023 122  mg/dL Final   Admission on 10/30/2023, Discharged on 10/31/2023   Component Date Value Ref Range Status    WBC 10/30/2023 5.60  3.90 - 12.70 K/uL Final    RBC 10/30/2023 4.49 (L)  4.60 - 6.20 M/uL Final    Hemoglobin 10/30/2023 13.2 (L)  14.0 - 18.0 g/dL Final    Hematocrit 10/30/2023 40.6  40.0 - 54.0 % Final    MCV 10/30/2023 90  82 - 98 fL Final    MCH 10/30/2023 29.4  27.0 - 31.0 pg Final    MCHC 10/30/2023 32.5  32.0 - 36.0 g/dL Final    RDW 10/30/2023 12.7  11.5 - 14.5 % Final    Platelets 10/30/2023 271  150 - 450 K/uL Final    MPV 10/30/2023 11.2  9.2 - 12.9 fL Final    Immature Granulocytes 10/30/2023 0.2  0.0 - 0.5 % Final    Gran # (ANC) 10/30/2023 2.8  1.8 - 7.7 K/uL Final    Immature Grans (Abs) 10/30/2023 0.01  0.00 - 0.04 K/uL Final    Lymph # 10/30/2023 1.9  1.0 - 4.8 K/uL Final    Mono # 10/30/2023 0.6  0.3 - 1.0 K/uL Final    Eos # 10/30/2023 0.4  0.0 - 0.5 K/uL Final    Baso # 10/30/2023 0.04  0.00 - 0.20 K/uL Final    nRBC 10/30/2023 0  0 /100 WBC Final    Gran % 10/30/2023 49.1  38.0 - 73.0 % Final    Lymph % 10/30/2023 33.4  18.0 - 48.0 % Final    Mono % 10/30/2023 10.2  4.0 - 15.0 % Final    Eosinophil % 10/30/2023 6.4  0.0 - 8.0 % Final    Basophil % 10/30/2023  0.7  0.0 - 1.9 % Final    Differential Method 10/30/2023 Automated   Final    Sodium 10/30/2023 145  136 - 145 mmol/L Final    Potassium 10/30/2023 4.0  3.5 - 5.1 mmol/L Final    Chloride 10/30/2023 108  95 - 110 mmol/L Final    CO2 10/30/2023 26  23 - 29 mmol/L Final    Glucose 10/30/2023 103  70 - 110 mg/dL Final    BUN 10/30/2023 9  6 - 20 mg/dL Final    Creatinine 10/30/2023 0.8  0.5 - 1.4 mg/dL Final    Calcium 10/30/2023 9.1  8.7 - 10.5 mg/dL Final    Total Protein 10/30/2023 6.9  6.0 - 8.4 g/dL Final    Albumin 10/30/2023 3.9  3.5 - 5.2 g/dL Final    Total Bilirubin 10/30/2023 0.2  0.1 - 1.0 mg/dL Final    Alkaline Phosphatase 10/30/2023 93  55 - 135 U/L Final    AST 10/30/2023 12  10 - 40 U/L Final    ALT 10/30/2023 16  10 - 44 U/L Final    eGFR 10/30/2023 >60  >60 mL/min/1.73 m^2 Final    Anion Gap 10/30/2023 11  8 - 16 mmol/L Final    TSH 10/30/2023 2.169  0.400 - 4.000 uIU/mL Final    Specimen UA 10/30/2023 Urine, Clean Catch   Final    Color, UA 10/30/2023 Yellow  Yellow, Straw, Hannah Final    Appearance, UA 10/30/2023 Clear  Clear Final    pH, UA 10/30/2023 6.0  5.0 - 8.0 Final    Specific Gravity, UA 10/30/2023 1.010  1.005 - 1.030 Final    Protein, UA 10/30/2023 Negative  Negative Final    Glucose, UA 10/30/2023 Negative  Negative Final    Ketones, UA 10/30/2023 Negative  Negative Final    Bilirubin (UA) 10/30/2023 Negative  Negative Final    Occult Blood UA 10/30/2023 Negative  Negative Final    Nitrite, UA 10/30/2023 Negative  Negative Final    Urobilinogen, UA 10/30/2023 Negative  <2.0 EU/dL Final    Leukocytes, UA 10/30/2023 Negative  Negative Final    Benzodiazepines 10/30/2023 Negative  Negative Final    Methadone metabolites 10/30/2023 Negative  Negative Final    Cocaine (Metab.) 10/30/2023 Presumptive Positive (A)  Negative Final    Opiate Scrn, Ur 10/30/2023 Negative  Negative Final    Barbiturate Screen, Ur 10/30/2023 Negative  Negative Final    Amphetamine Screen, Ur 10/30/2023 Negative   Negative Final    THC 10/30/2023 Negative  Negative Final    Phencyclidine 10/30/2023 Negative  Negative Final    Creatinine, Urine 10/30/2023 65.1  23.0 - 375.0 mg/dL Final    Toxicology Information 10/30/2023 SEE COMMENT   Final    Alcohol, Serum 10/30/2023 <10  <10 mg/dL Final    Acetaminophen (Tylenol), Serum 10/30/2023 <3.0 (L)  10.0 - 20.0 ug/mL Final    POC Rapid COVID 10/30/2023 Negative  Negative Final     Acceptable 10/30/2023 Yes   Final         Discharged Condition: stable and improved; not currently a danger to self/others or gravely disabled    Disposition: Home or Self Care, pt declined rehab    Is patient being discharged on multiple neuroleptics? No    Follow Up/Patient Instructions:     Take all medications as prescribed.  Attend all psychiatric and medical follow up appointments.   Abstain from all drugs and alcohol.  Call the crisis line at: [phone] for help in a crisis and emergent situations or call 911 and Return to ED for any acute worsening of your condition including suicidal or homicidal ideations      Discharge Procedure Orders   Diet Adult Regular     Notify your health care provider if you experience any of the following:  temperature >100.4     Notify your health care provider if you experience any of the following:  persistent nausea and vomiting or diarrhea     Notify your health care provider if you experience any of the following:  increased confusion or weakness     Notify your health care provider if you experience any of the following:  persistent dizziness, light-headedness, or visual disturbances     Notify your health care provider if you experience any of the following:   Order Comments: Suicidal thoughts, homicidal thoughts, or any other changes in mental status  If you would like immediate help/crisis counseling, please call [phone] (TALK). Through this toll-free phone number for a network of crisis centers across the country. These centers  staff their lines with people who are trained to listen and offer support to people in emotional crisis. If you are in an emergency, please call 911.     Activity as tolerated           [place] [facility] [place]      Medications:  Reconciled Home Medications:      Medication List        START taking these medications      FLUoxetine 20 MG capsule  Take 1 capsule (20 mg total) by mouth once daily.  Start taking on: November 9, 2023     hydrOXYzine pamoate 50 MG Cap  Commonly known as: VISTARIL  Take 1 capsule (50 mg total) by mouth every 6 (six) hours as needed (anxiety).     nicotine 14 mg/24 hr  Commonly known as: NICODERM CQ  Place 1 patch onto the skin daily as needed (nicotine withdrawal).     OLANZapine 15 MG Tab  Commonly known as: ZyPREXA  Take 1 tablet (15 mg total) by mouth every evening.              Psychotherapy:  Target symptoms: substance abuse, psychosis  Why chosen therapy is appropriate versus another modality: relevant to diagnosis  Outcome monitoring methods: self-report  Therapeutic intervention type: supportive psychotherapy  Topics discussed/themes:  educational, building skills sets for symptom management, symptom recognition, substance abuse  The patient's response to the intervention is accepting. The patient's progress toward treatment goals is fair.   Duration of intervention: 16 minutes.      Diet: regular     Activity as tolerated    Total time spent discharging patient: 35 minutes    Jose Fritz III, MD  Psychiatry

## 2023-11-08 NOTE — PROGRESS NOTES
RD triggered for length of stay. Spoke with RN and RN stated that the pt does not have any nutrition issues at this time. RD to sign off. Please consult if any nutrition/dietary issues arise.

## 2023-11-08 NOTE — NURSING
POC reviewed this shift and is on going. Patient is calm, and cooperative. Denies Suicidal Ideation, Homicidal Ideation, Auditory Hallucinations, Visual Hallucinations, Tactile Hallucinations, Gustatory Hallucinations, and Delusions. Patient out on the floor all day in the dining room coloring. Patient is cooperative, no issue at this time. Pt continues to be medication compliant and staff will continue to monitor pt closely while on the unit.

## 2023-11-08 NOTE — PLAN OF CARE
POC reviewed this shift and is ongoing.  Pt cooperative with staff and interacts with peers. Spends time coloring and watching tv. No ss of distress.

## 2023-11-30 ENCOUNTER — HOSPITAL ENCOUNTER (EMERGENCY)
Facility: HOSPITAL | Age: 38
Discharge: PSYCHIATRIC OR SUBSTANCE USE FACILITY | End: 2023-12-01
Attending: EMERGENCY MEDICINE

## 2023-11-30 DIAGNOSIS — R44.1 VISUAL HALLUCINATION: ICD-10-CM

## 2023-11-30 DIAGNOSIS — R45.850 HOMICIDAL IDEATION: ICD-10-CM

## 2023-11-30 DIAGNOSIS — R44.0 AUDITORY HALLUCINATION: ICD-10-CM

## 2023-11-30 DIAGNOSIS — F23 ACUTE PSYCHOSIS: Primary | ICD-10-CM

## 2023-11-30 LAB
ABO + RH BLD: NORMAL
ALBUMIN SERPL BCP-MCNC: 4.3 G/DL (ref 3.5–5.2)
ALLENS TEST: ABNORMAL
ALP SERPL-CCNC: 103 U/L (ref 55–135)
ALT SERPL W/O P-5'-P-CCNC: 41 U/L (ref 10–44)
AMPHET+METHAMPHET UR QL: NEGATIVE
ANION GAP SERPL CALC-SCNC: 14 MMOL/L (ref 8–16)
ANION GAP SERPL CALC-SCNC: 19 MMOL/L (ref 8–16)
APAP SERPL-MCNC: <3 UG/ML (ref 10–20)
AST SERPL-CCNC: 20 U/L (ref 10–40)
BARBITURATES UR QL SCN>200 NG/ML: NEGATIVE
BASOPHILS # BLD AUTO: 0.03 K/UL (ref 0–0.2)
BASOPHILS NFR BLD: 0.5 % (ref 0–1.9)
BENZODIAZ UR QL SCN>200 NG/ML: NEGATIVE
BILIRUB SERPL-MCNC: 0.3 MG/DL (ref 0.1–1)
BILIRUB UR QL STRIP: NEGATIVE
BLD GP AB SCN CELLS X3 SERPL QL: NORMAL
BUN SERPL-MCNC: 11 MG/DL (ref 6–20)
BUN SERPL-MCNC: 11 MG/DL (ref 6–30)
BZE UR QL SCN: NEGATIVE
CALCIUM SERPL-MCNC: 10 MG/DL (ref 8.7–10.5)
CANNABINOIDS UR QL SCN: NEGATIVE
CHLORIDE SERPL-SCNC: 102 MMOL/L (ref 95–110)
CHLORIDE SERPL-SCNC: 98 MMOL/L (ref 95–110)
CLARITY UR: CLEAR
CO2 SERPL-SCNC: 24 MMOL/L (ref 23–29)
COLOR UR: COLORLESS
CREAT SERPL-MCNC: 0.8 MG/DL (ref 0.5–1.4)
CREAT SERPL-MCNC: 0.9 MG/DL (ref 0.5–1.4)
CREAT UR-MCNC: 27.8 MG/DL (ref 23–375)
DIFFERENTIAL METHOD: ABNORMAL
EOSINOPHIL # BLD AUTO: 0.1 K/UL (ref 0–0.5)
EOSINOPHIL NFR BLD: 2.2 % (ref 0–8)
ERYTHROCYTE [DISTWIDTH] IN BLOOD BY AUTOMATED COUNT: 12.9 % (ref 11.5–14.5)
EST. GFR  (NO RACE VARIABLE): >60 ML/MIN/1.73 M^2
ETHANOL SERPL-MCNC: <10 MG/DL
GLUCOSE SERPL-MCNC: 115 MG/DL (ref 70–110)
GLUCOSE SERPL-MCNC: 122 MG/DL (ref 70–110)
GLUCOSE UR QL STRIP: NEGATIVE
HCT VFR BLD AUTO: 42 % (ref 40–54)
HCT VFR BLD CALC: 42 %PCV (ref 36–54)
HGB BLD-MCNC: 13.9 G/DL (ref 14–18)
HGB UR QL STRIP: NEGATIVE
IMM GRANULOCYTES # BLD AUTO: 0.01 K/UL (ref 0–0.04)
IMM GRANULOCYTES NFR BLD AUTO: 0.2 % (ref 0–0.5)
KETONES UR QL STRIP: NEGATIVE
LEUKOCYTE ESTERASE UR QL STRIP: NEGATIVE
LIPASE SERPL-CCNC: 28 U/L (ref 4–60)
LYMPHOCYTES # BLD AUTO: 1.9 K/UL (ref 1–4.8)
LYMPHOCYTES NFR BLD: 33.3 % (ref 18–48)
MCH RBC QN AUTO: 29.1 PG (ref 27–31)
MCHC RBC AUTO-ENTMCNC: 33.1 G/DL (ref 32–36)
MCV RBC AUTO: 88 FL (ref 82–98)
METHADONE UR QL SCN>300 NG/ML: NEGATIVE
MONOCYTES # BLD AUTO: 0.5 K/UL (ref 0.3–1)
MONOCYTES NFR BLD: 8.8 % (ref 4–15)
NEUTROPHILS # BLD AUTO: 3.1 K/UL (ref 1.8–7.7)
NEUTROPHILS NFR BLD: 55 % (ref 38–73)
NITRITE UR QL STRIP: NEGATIVE
NRBC BLD-RTO: 0 /100 WBC
OPIATES UR QL SCN: NEGATIVE
PCP UR QL SCN>25 NG/ML: NEGATIVE
PH UR STRIP: 8 [PH] (ref 5–8)
PLATELET # BLD AUTO: 308 K/UL (ref 150–450)
PMV BLD AUTO: 11.3 FL (ref 9.2–12.9)
POC IONIZED CALCIUM: 1.22 MMOL/L (ref 1.06–1.42)
POC TCO2 (MEASURED): 29 MMOL/L (ref 23–29)
POTASSIUM BLD-SCNC: 3.5 MMOL/L (ref 3.5–5.1)
POTASSIUM SERPL-SCNC: 3.4 MMOL/L (ref 3.5–5.1)
PROT SERPL-MCNC: 8.1 G/DL (ref 6–8.4)
PROT UR QL STRIP: NEGATIVE
RBC # BLD AUTO: 4.77 M/UL (ref 4.6–6.2)
SAMPLE: ABNORMAL
SITE: ABNORMAL
SODIUM BLD-SCNC: 141 MMOL/L (ref 136–145)
SODIUM SERPL-SCNC: 140 MMOL/L (ref 136–145)
SP GR UR STRIP: <1.005 (ref 1–1.03)
SPECIMEN OUTDATE: NORMAL
TOXICOLOGY INFORMATION: NORMAL
TSH SERPL DL<=0.005 MIU/L-ACNC: 1.73 UIU/ML (ref 0.4–4)
URN SPEC COLLECT METH UR: ABNORMAL
UROBILINOGEN UR STRIP-ACNC: NEGATIVE EU/DL
WBC # BLD AUTO: 5.55 K/UL (ref 3.9–12.7)

## 2023-11-30 PROCEDURE — 96374 THER/PROPH/DIAG INJ IV PUSH: CPT

## 2023-11-30 PROCEDURE — 85025 COMPLETE CBC W/AUTO DIFF WBC: CPT | Performed by: STUDENT IN AN ORGANIZED HEALTH CARE EDUCATION/TRAINING PROGRAM

## 2023-11-30 PROCEDURE — 80143 DRUG ASSAY ACETAMINOPHEN: CPT

## 2023-11-30 PROCEDURE — G0425 PR INPT TELEHEALTH CONSULT 30M: ICD-10-PCS | Mod: GT,,, | Performed by: PSYCHIATRY & NEUROLOGY

## 2023-11-30 PROCEDURE — 85014 HEMATOCRIT: CPT

## 2023-11-30 PROCEDURE — 84443 ASSAY THYROID STIM HORMONE: CPT

## 2023-11-30 PROCEDURE — 81003 URINALYSIS AUTO W/O SCOPE: CPT | Mod: 59 | Performed by: STUDENT IN AN ORGANIZED HEALTH CARE EDUCATION/TRAINING PROGRAM

## 2023-11-30 PROCEDURE — 84295 ASSAY OF SERUM SODIUM: CPT

## 2023-11-30 PROCEDURE — 82077 ASSAY SPEC XCP UR&BREATH IA: CPT

## 2023-11-30 PROCEDURE — 83690 ASSAY OF LIPASE: CPT | Performed by: STUDENT IN AN ORGANIZED HEALTH CARE EDUCATION/TRAINING PROGRAM

## 2023-11-30 PROCEDURE — 82565 ASSAY OF CREATININE: CPT

## 2023-11-30 PROCEDURE — 80053 COMPREHEN METABOLIC PANEL: CPT | Performed by: STUDENT IN AN ORGANIZED HEALTH CARE EDUCATION/TRAINING PROGRAM

## 2023-11-30 PROCEDURE — 63600175 PHARM REV CODE 636 W HCPCS

## 2023-11-30 PROCEDURE — 99900035 HC TECH TIME PER 15 MIN (STAT)

## 2023-11-30 PROCEDURE — 82330 ASSAY OF CALCIUM: CPT

## 2023-11-30 PROCEDURE — 25000003 PHARM REV CODE 250

## 2023-11-30 PROCEDURE — 84132 ASSAY OF SERUM POTASSIUM: CPT

## 2023-11-30 PROCEDURE — 99285 EMERGENCY DEPT VISIT HI MDM: CPT

## 2023-11-30 PROCEDURE — 80307 DRUG TEST PRSMV CHEM ANLYZR: CPT

## 2023-11-30 PROCEDURE — G0425 INPT/ED TELECONSULT30: HCPCS | Mod: GT,,, | Performed by: PSYCHIATRY & NEUROLOGY

## 2023-11-30 PROCEDURE — 86900 BLOOD TYPING SEROLOGIC ABO: CPT | Performed by: STUDENT IN AN ORGANIZED HEALTH CARE EDUCATION/TRAINING PROGRAM

## 2023-11-30 RX ORDER — POTASSIUM CHLORIDE 20 MEQ/1
20 TABLET, EXTENDED RELEASE ORAL
Status: COMPLETED | OUTPATIENT
Start: 2023-11-30 | End: 2023-11-30

## 2023-11-30 RX ORDER — LORAZEPAM 2 MG/ML
1 INJECTION INTRAMUSCULAR
Status: COMPLETED | OUTPATIENT
Start: 2023-11-30 | End: 2023-11-30

## 2023-11-30 RX ADMIN — LORAZEPAM 1 MG: 2 INJECTION INTRAMUSCULAR; INTRAVENOUS at 08:11

## 2023-11-30 RX ADMIN — POTASSIUM CHLORIDE 20 MEQ: 1500 TABLET, EXTENDED RELEASE ORAL at 08:11

## 2023-12-01 VITALS
WEIGHT: 134.94 LBS | SYSTOLIC BLOOD PRESSURE: 101 MMHG | RESPIRATION RATE: 20 BRPM | HEIGHT: 65 IN | OXYGEN SATURATION: 100 % | HEART RATE: 67 BPM | BODY MASS INDEX: 22.48 KG/M2 | DIASTOLIC BLOOD PRESSURE: 60 MMHG | TEMPERATURE: 98 F

## 2023-12-01 NOTE — ED PROVIDER NOTES
"Encounter Date: 11/30/2023       History     Chief Complaint   Patient presents with    Nausea    Vomiting     Pt stated he has been vomiting dark red blood. Pt also c/o of sore throat and difficulty speaking. Pt stated he has also been having some nausea and flank pain. Pt denies any other discomforts.      Patient is a 38-year-old male with a past medical history of psychosis who presents to the emergency department for evaluation of vomiting brown blood that began last night.  Patient reports auditory and visual hallucinations over the last month.  Reports he hears people telling him to "end it".  Reports homicidal ideation at [employer].  Denies plan.  Reports history of previous inpatient psychiatric treatment.  Denies suicidal ideation.  Reports body aches all over.  Denies history of EGD or colonoscopy.  Denies specific headache, lightheadedness, dizziness, chest pain, shortness on breath, abdominal pain, nausea, vomiting, diarrhea.  Reports hoarse voice.    A  was used.     Review of patient's allergies indicates:  No Known Allergies  Past Medical History:   Diagnosis Date    History of psychiatric treatment      No past surgical history on file.  No family history on file.  Social History     Tobacco Use    Smoking status: Every Day     Current packs/day: 1.00     Average packs/day: 1 pack/day for 25.0 years (25.0 ttl pk-yrs)     Types: Cigarettes     Passive exposure: Past   Substance Use Topics    Alcohol use: Yes     Comment: occ    Drug use: Yes     Types: Cocaine     Review of Systems   Constitutional:  Negative for chills and fever.   Respiratory:  Negative for cough and shortness of breath.         (+) Hemoptysis   Cardiovascular:  Negative for chest pain.   Gastrointestinal:  Negative for abdominal pain, diarrhea, nausea and vomiting.   Musculoskeletal:  Positive for myalgias. Negative for neck pain and neck stiffness.   Neurological:  Negative for dizziness, light-headedness and " headaches.   Psychiatric/Behavioral:  Positive for hallucinations.         (+) auditory and visual hallucinations  (+) homicidal ideation       Physical Exam     Initial Vitals [11/30/23 1730]   BP Pulse Resp Temp SpO2   132/83 82 20 97.6 °F (36.4 °C) 100 %      MAP       --         Physical Exam    Nursing note and vitals reviewed.  Constitutional: He appears well-developed and well-nourished.   HENT:   Head: Normocephalic and atraumatic.   Right Ear: External ear normal.   Left Ear: External ear normal.   Neck: Carotid bruit is not present.   Normal range of motion.  Cardiovascular:  Normal rate, regular rhythm, normal heart sounds and intact distal pulses.     Exam reveals no gallop and no friction rub.       No murmur heard.  Pulmonary/Chest: Breath sounds normal. No respiratory distress. He has no wheezes. He has no rhonchi. He has no rales.   Abdominal: Abdomen is soft. Bowel sounds are normal. He exhibits no distension. There is no abdominal tenderness. There is no rebound and no guarding.   Musculoskeletal:         General: Normal range of motion.      Cervical back: Normal range of motion.     Neurological: He is alert and oriented to person, place, and time. GCS score is 15. GCS eye subscore is 4. GCS verbal subscore is 5. GCS motor subscore is 6.   Psychiatric: His speech is normal and behavior is normal. His affect is blunt.         ED Course   Procedures  Labs Reviewed   CBC W/ AUTO DIFFERENTIAL - Abnormal; Notable for the following components:       Result Value    Hemoglobin 13.9 (*)     All other components within normal limits   COMPREHENSIVE METABOLIC PANEL - Abnormal; Notable for the following components:    Potassium 3.4 (*)     Glucose 115 (*)     All other components within normal limits   URINALYSIS, REFLEX TO URINE CULTURE - Abnormal; Notable for the following components:    Color, UA Colorless (*)     Specific Gravity, UA <1.005 (*)     All other components within normal limits    Narrative:  "    Specimen Source->Urine   ACETAMINOPHEN LEVEL - Abnormal; Notable for the following components:    Acetaminophen (Tylenol), Serum <3.0 (*)     All other components within normal limits   ISTAT PROCEDURE - Abnormal; Notable for the following components:    POC Glucose 122 (*)     POC Anion Gap 19 (*)     All other components within normal limits   LIPASE   TSH   DRUG SCREEN PANEL, URINE EMERGENCY    Narrative:     Specimen Source->Urine   ALCOHOL,MEDICAL (ETHANOL)   TYPE & SCREEN   ISTAT CHEM8          Imaging Results    None          Medications   LORazepam injection 1 mg (1 mg Intravenous Given 11/30/23 2013)   potassium chloride SA CR tablet 20 mEq (20 mEq Oral Given 11/30/23 2013)     Medical Decision Making  This is an emergent evaluation of a 38-year-old male with a past medical history of psychosis who presents to the emergency department for evaluation of vomiting brown blood that began last night.  Patient reports auditory and visual hallucinations over the last month.  Reports he hears people telling him to "end it".  Reports homicidal ideation at [employer].  Denies plan.  Reports history of previous inpatient psychiatric treatment.  Denies suicidal ideation.  Physical exam unremarkable.  Patient does have a more blunt affect.  However, in appropriate mood.  Regular rate rhythm without murmurs.  No carotid bruits appreciated on exam. Lungs are clear to auscultation bilaterally.  Abdomen is soft, nontender, non distended, with normal bowel sounds. Differential diagnosis includes but is not limited to psychosis, schizophrenia, bipolar disorder, UTI, drug use.  Workup initiated with psych routine orders CBC, CMP, type and screen, acetaminophen, ethanol, lipase, TSH, urinalysis, drug screen.  PEC was placed.  Patient was changed into blue scrubs and informed about psychiatric hold with security at bedside.  CBC is without leukocytosis, hemoglobin of 13.9, normal hematocrit.  CMP with normal renal function, " potassium of 3.4, no other electrolyte abnormalities.  Acetaminophen level less than 3, normal ethanol, normal lipase.  TSH within normal limits.  Urinalysis shows no signs of infection, negative for nitrites or leukocytes.  Urine drug screen negative.  A positive.  Notified by RN that patient is anxious and tearful, ordered Ativan.  Ordered 20 mEq of potassium for supplementation.    8:11 PM This is Jose Ward PA-C dictating. I handed off care of the patient at shift change to Dr. Morris pending tele psychiatry recommendations and final disposition.     2120 - Pt seens by tele psych. Recommends PEC and admission to psych facility. Transfer request placed.     Amount and/or Complexity of Data Reviewed  Labs: ordered.    Risk  Prescription drug management.  Diagnosis or treatment significantly limited by social determinants of health.               ED Course as of 11/30/23 2135   Thu Nov 30, 2023   1826 Patient informed about PEC with security at bedside [TM]      ED Course User Index  [TM] Jose Ward PA-C       Medically cleared for psychiatry placement: 11/30/2023  9:33 PM                   Clinical Impression:  Final diagnoses:  [R44.0] Auditory hallucination  [R44.1] Visual hallucination  [R45.850] Homicidal ideation  [F23] Acute psychosis (Primary)          ED Disposition Condition    Transfer to Psych Facility Stable          ED Prescriptions    None       Follow-up Information    None          Angelina Morris MD  11/30/23 2135

## 2023-12-01 NOTE — ED NOTES
Hospital security at bedside to escort pt to SPD unit. Pt AAOx 3 denies complaints. Pt is calm and cooperative at this time.

## 2023-12-01 NOTE — ED NOTES
Resumed care of pt, pt from [facility] with auditory hallucinations, reports the voices are telling him to kill people. Denies SI. Pt updated on PEC status by CHRIS Garcia via language line. Pt tearful in room. Calm and cooperative, 1:1 sitter at bedside.  Requesting medication to help relax, provider aware.

## 2023-12-01 NOTE — CONSULTS
"[facility]  Psychiatry  Telepsychiatry Consult Note    Please see previous notes:    Patient agreeable to consultation via telepsychiatry.    Tele-Consultation from Psychiatry started: 11/30/2023 at 0801  The chief complaint leading to psychiatric consultation is: AVH  This consultation was requested by the Emergency Department attending physician.  The location of the consulting psychiatrist is  [facility] .  The patient location is  [facility] EMERGENCY DEPARTMENT   The patient arrived at the ED at: 6pm    Also present with the patient at the time of the consultation: rn    Patient Identification:   Emre Enriquez is a 38 y.o. male.    Patient information was obtained from patient.  Patient presented involuntarily to the Emergency Department by ambulance where the patient received see Ambulance Run Sheet prior to arrival.    Consults  Teleconsult Time Documentation  Subjective:     History of Present Illness:  No notes on file Patient is a 38-year-old male with a past medical history of psychosis who presents to the emergency department for evaluation of vomiting brown blood that began last night.  Patient reports auditory and visual hallucinations over the last month.  Reports he hears people telling him to "end it".  Reports homicidal ideation at [facility].  Denies plan.  Reports history of previous inpatient psychiatric treatment.     Psychiatric History:   Previous Psychiatric Hospitalizations: Yes   Previous Medication Trials: Yes   Previous Suicide Attempts: yes   History of Violence: yes  History of Depression: yes  History of Katherin: no  History of Auditory/Visual Hallucination yes  History of Delusions: no  Outpatient psychiatrist (current & past): No    Substance Abuse History:  Tobacco:No  Alcohol: No  Illicit Substances:No  Detox/Rehab: No    Legal History: Past charges/incarcerations: No     Family Psychiatric History: denies      Social History:  Developmental/Childhood:Achieved all " "developmental milestones timely  *Education:High School Diploma  Employment Status/Finances:Employed   Relationship Status/Sexual Orientation: Single:  Relationship strained  Children:  unk  Housing Status:  home     history:  NO  Access to gun: NO  Voodoo: endorses  Recreational activities: exercise    Psychiatric Mental Status Exam:  Arousal: alert  Sensorium/Orientation: oriented to grossly intact  Behavior/Cooperation: normal, cooperative   Speech: normal tone, normal rate, normal pitch, normal volume  Language: grossly intact  Mood: " ok "   Affect: appropriate  Thought Process: normal and logical  Thought Content:   Auditory hallucinations: YES:      Visual hallucinations: YES:      Paranoia: YES:      Delusions:  YES:      Suicidal ideation: NO  Homicidal ideation: YES:      Attention/Concentration:  intact  Memory:    Recent:  Intact   Remote: Intact   3/3 immediate, 3/3 at 5 min  Fund of Knowledge: Aware of current events   Abstract reasoning: proverbs were abstract  Insight: intact  Judgment: behavior is adequate to circumstances      Past Medical History:   Past Medical History:   Diagnosis Date    History of psychiatric treatment       Laboratory Data:   Labs Reviewed   CBC W/ AUTO DIFFERENTIAL - Abnormal; Notable for the following components:       Result Value    Hemoglobin 13.9 (*)     All other components within normal limits   COMPREHENSIVE METABOLIC PANEL - Abnormal; Notable for the following components:    Potassium 3.4 (*)     Glucose 115 (*)     All other components within normal limits   URINALYSIS, REFLEX TO URINE CULTURE - Abnormal; Notable for the following components:    Color, UA Colorless (*)     Specific Gravity, UA <1.005 (*)     All other components within normal limits    Narrative:     Specimen Source->Urine   ACETAMINOPHEN LEVEL - Abnormal; Notable for the following components:    Acetaminophen (Tylenol), Serum <3.0 (*)     All other components within normal limits   ISTAT " PROCEDURE - Abnormal; Notable for the following components:    POC Glucose 122 (*)     POC Anion Gap 19 (*)     All other components within normal limits   LIPASE   TSH   DRUG SCREEN PANEL, URINE EMERGENCY    Narrative:     Specimen Source->Urine   ALCOHOL,MEDICAL (ETHANOL)   TYPE & SCREEN   ISTAT CHEM8       Neurological History:  Seizures: No  Head trauma: No    Allergies:   Review of patient's allergies indicates:  No Known Allergies    Medications in ER:   Medications   LORazepam injection 1 mg (1 mg Intravenous Given 11/30/23 2013)   potassium chloride SA CR tablet 20 mEq (20 mEq Oral Given 11/30/23 2013)       Medications at home: denies    No new subjective & objective note has been filed under this hospital service since the last note was generated.      Assessment - Diagnosis - Goals:     Diagnosis/Impression: unspecified schizophrenia spectrum and other psychotic disorder    Rec: [facility] and inpt treatment - command AVH telling him to harm others, patient states he is worried he will act on it . used for interaction.     Time with patient: 20 min      More than 50% of the time was spent counseling/coordinating care    Consulting clinician was informed of the encounter and consult note.    Consultation ended: 11/30/2023 at 0910    Clarence Medina MD   Psychiatry  [facility]

## 2023-12-01 NOTE — ED TRIAGE NOTES
Pt presents to ED with complaints of vominting up bloos and sore throat since this morning. Pt been also complains of nausea and flank pain. Upon assessment pt also endorses auditory hallucination x1 month. Pt also endorsees SI with no plan.